# Patient Record
Sex: FEMALE | Race: WHITE | NOT HISPANIC OR LATINO | Employment: FULL TIME | URBAN - METROPOLITAN AREA
[De-identification: names, ages, dates, MRNs, and addresses within clinical notes are randomized per-mention and may not be internally consistent; named-entity substitution may affect disease eponyms.]

---

## 2021-07-22 ENCOUNTER — TELEPHONE (OUTPATIENT)
Dept: OBGYN CLINIC | Facility: HOSPITAL | Age: 56
End: 2021-07-22

## 2021-07-22 NOTE — TELEPHONE ENCOUNTER
Called patient to reschedule Dr Joceline Clayton appointment for 7/28, patient stated that she will call back to reschedule

## 2021-08-31 ENCOUNTER — APPOINTMENT (EMERGENCY)
Dept: RADIOLOGY | Facility: HOSPITAL | Age: 56
End: 2021-08-31
Payer: COMMERCIAL

## 2021-08-31 ENCOUNTER — HOSPITAL ENCOUNTER (EMERGENCY)
Facility: HOSPITAL | Age: 56
Discharge: HOME | End: 2021-08-31
Attending: EMERGENCY MEDICINE
Payer: COMMERCIAL

## 2021-08-31 VITALS
RESPIRATION RATE: 18 BRPM | SYSTOLIC BLOOD PRESSURE: 209 MMHG | HEIGHT: 64 IN | HEART RATE: 65 BPM | TEMPERATURE: 98.3 F | WEIGHT: 173.5 LBS | BODY MASS INDEX: 29.62 KG/M2 | OXYGEN SATURATION: 98 % | DIASTOLIC BLOOD PRESSURE: 96 MMHG

## 2021-08-31 DIAGNOSIS — I16.0 HYPERTENSIVE URGENCY: Primary | ICD-10-CM

## 2021-08-31 LAB
ALBUMIN SERPL BCP-MCNC: 4.1 G/DL (ref 3.5–5)
ALP SERPL-CCNC: 113 U/L (ref 46–116)
ALT SERPL W P-5'-P-CCNC: 21 U/L (ref 12–78)
ANION GAP SERPL CALCULATED.3IONS-SCNC: 7 MMOL/L (ref 4–13)
AST SERPL W P-5'-P-CCNC: 20 U/L (ref 5–45)
ATRIAL RATE: 67 BPM
BASOPHILS # BLD AUTO: 0.05 THOUSANDS/ΜL (ref 0–0.1)
BASOPHILS NFR BLD AUTO: 1 % (ref 0–1)
BILIRUB SERPL-MCNC: 0.56 MG/DL (ref 0.2–1)
BUN SERPL-MCNC: 14 MG/DL (ref 5–25)
CALCIUM SERPL-MCNC: 9.5 MG/DL (ref 8.3–10.1)
CHLORIDE SERPL-SCNC: 102 MMOL/L (ref 100–108)
CO2 SERPL-SCNC: 29 MMOL/L (ref 21–32)
CREAT SERPL-MCNC: 0.74 MG/DL (ref 0.6–1.3)
EOSINOPHIL # BLD AUTO: 0.12 THOUSAND/ΜL (ref 0–0.61)
EOSINOPHIL NFR BLD AUTO: 2 % (ref 0–6)
ERYTHROCYTE [DISTWIDTH] IN BLOOD BY AUTOMATED COUNT: 12.5 % (ref 11.6–15.1)
GFR SERPL CREATININE-BSD FRML MDRD: 91 ML/MIN/1.73SQ M
GLUCOSE SERPL-MCNC: 86 MG/DL (ref 65–140)
HCT VFR BLD AUTO: 40.7 % (ref 34.8–46.1)
HGB BLD-MCNC: 13.4 G/DL (ref 11.5–15.4)
IMM GRANULOCYTES # BLD AUTO: 0.03 THOUSAND/UL (ref 0–0.2)
IMM GRANULOCYTES NFR BLD AUTO: 1 % (ref 0–2)
LYMPHOCYTES # BLD AUTO: 1.5 THOUSANDS/ΜL (ref 0.6–4.47)
LYMPHOCYTES NFR BLD AUTO: 27 % (ref 14–44)
MCH RBC QN AUTO: 31.3 PG (ref 26.8–34.3)
MCHC RBC AUTO-ENTMCNC: 32.9 G/DL (ref 31.4–37.4)
MCV RBC AUTO: 95 FL (ref 82–98)
MONOCYTES # BLD AUTO: 0.41 THOUSAND/ΜL (ref 0.17–1.22)
MONOCYTES NFR BLD AUTO: 7 % (ref 4–12)
NEUTROPHILS # BLD AUTO: 3.52 THOUSANDS/ΜL (ref 1.85–7.62)
NEUTS SEG NFR BLD AUTO: 62 % (ref 43–75)
NRBC BLD AUTO-RTO: 0 /100 WBCS
P AXIS: 79 DEGREES
PLATELET # BLD AUTO: 202 THOUSANDS/UL (ref 149–390)
PMV BLD AUTO: 9.9 FL (ref 8.9–12.7)
POTASSIUM SERPL-SCNC: 4.3 MMOL/L (ref 3.5–5.3)
PR INTERVAL: 144 MS
PROT SERPL-MCNC: 7.7 G/DL (ref 6.4–8.2)
QRS AXIS: 65 DEGREES
QRSD INTERVAL: 82 MS
QT INTERVAL: 394 MS
QTC INTERVAL: 410 MS
RBC # BLD AUTO: 4.28 MILLION/UL (ref 3.81–5.12)
SODIUM SERPL-SCNC: 138 MMOL/L (ref 136–145)
T WAVE AXIS: 56 DEGREES
TROPONIN I SERPL-MCNC: <0.02 NG/ML
VENTRICULAR RATE: 65 BPM
WBC # BLD AUTO: 5.63 THOUSAND/UL (ref 4.31–10.16)

## 2021-08-31 PROCEDURE — 36415 COLL VENOUS BLD VENIPUNCTURE: CPT

## 2021-08-31 PROCEDURE — 84484 ASSAY OF TROPONIN QUANT: CPT | Performed by: EMERGENCY MEDICINE

## 2021-08-31 PROCEDURE — 85025 COMPLETE CBC W/AUTO DIFF WBC: CPT | Performed by: EMERGENCY MEDICINE

## 2021-08-31 PROCEDURE — 99283 EMERGENCY DEPT VISIT LOW MDM: CPT

## 2021-08-31 PROCEDURE — 93010 ELECTROCARDIOGRAM REPORT: CPT | Performed by: INTERNAL MEDICINE

## 2021-08-31 PROCEDURE — 99285 EMERGENCY DEPT VISIT HI MDM: CPT | Performed by: EMERGENCY MEDICINE

## 2021-08-31 PROCEDURE — 80053 COMPREHEN METABOLIC PANEL: CPT | Performed by: EMERGENCY MEDICINE

## 2021-08-31 PROCEDURE — 93005 ELECTROCARDIOGRAM TRACING: CPT

## 2021-08-31 RX ORDER — AMLODIPINE BESYLATE 5 MG/1
5 TABLET ORAL ONCE
Status: COMPLETED | OUTPATIENT
Start: 2021-08-31 | End: 2021-08-31

## 2021-08-31 RX ORDER — AMLODIPINE BESYLATE 5 MG/1
5 TABLET ORAL DAILY
Qty: 30 TABLET | Refills: 0 | Status: SHIPPED | OUTPATIENT
Start: 2021-08-31 | End: 2021-12-15

## 2021-08-31 RX ADMIN — AMLODIPINE BESYLATE 5 MG: 5 TABLET ORAL at 14:24

## 2021-08-31 NOTE — ED PROVIDER NOTES
History  Chief Complaint   Patient presents with    Hypertension     Pt reports checking her BP earlier today, was 170s/100s with LUE tingling and fogginess  Seen at pcp yesterday and was told to keep an eye on her BP d/t 140s/90s there  Denies HA or CP        History provided by:  Patient and relative (daughter)  Hypertension  Severity:  Moderate  Onset quality:  Gradual  Duration:  2 weeks  Timing:  Constant  Notable PTA blood pressures:  220/100  Context: abnormal sodium ( eatting out a lot more over past few months) and stress ( increased stress)    Relieved by:  None tried  Worsened by:  Nothing  Ineffective treatments:  None tried  Associated symptoms: anxiety    Associated symptoms: no abdominal pain, no chest pain, no dizziness, no fever, no headaches, no nausea, no neck pain, no palpitations, no shortness of breath and not vomiting        None       Past Medical History:   Diagnosis Date    Hypertension        Past Surgical History:   Procedure Laterality Date    ABDOMINAL SURGERY      bariatric surgery        History reviewed  No pertinent family history  I have reviewed and agree with the history as documented  E-Cigarette/Vaping     E-Cigarette/Vaping Substances     Social History     Tobacco Use    Smoking status: Never Smoker    Smokeless tobacco: Never Used   Substance Use Topics    Alcohol use: Yes     Comment: 3-4x per week     Drug use: Never       Review of Systems   Constitutional: Negative for activity change, chills, diaphoresis and fever  HENT: Negative for congestion, sinus pressure and sore throat  Eyes: Negative for pain and visual disturbance  Respiratory: Negative for cough, chest tightness, shortness of breath, wheezing and stridor  Cardiovascular: Negative for chest pain and palpitations  Gastrointestinal: Negative for abdominal distention, abdominal pain, constipation, diarrhea, nausea and vomiting  Genitourinary: Negative for dysuria and frequency  Musculoskeletal: Negative for neck pain and neck stiffness  Skin: Negative for rash  Neurological: Negative for dizziness, speech difficulty, light-headedness, numbness and headaches  Psychiatric/Behavioral: The patient is nervous/anxious  Physical Exam  Physical Exam  Vitals reviewed  Constitutional:       General: She is not in acute distress  Appearance: She is well-developed  She is not diaphoretic  HENT:      Head: Normocephalic and atraumatic  Right Ear: External ear normal       Left Ear: External ear normal       Nose: Nose normal    Eyes:      General:         Right eye: No discharge  Left eye: No discharge  Pupils: Pupils are equal, round, and reactive to light  Neck:      Trachea: No tracheal deviation  Cardiovascular:      Rate and Rhythm: Normal rate and regular rhythm  Heart sounds: Normal heart sounds  No murmur heard  Pulmonary:      Effort: Pulmonary effort is normal  No respiratory distress  Breath sounds: Normal breath sounds  No stridor  Abdominal:      General: There is no distension  Palpations: Abdomen is soft  Tenderness: There is no abdominal tenderness  There is no guarding or rebound  Musculoskeletal:         General: Normal range of motion  Cervical back: Normal range of motion and neck supple  Skin:     General: Skin is warm and dry  Coloration: Skin is not pale  Findings: No erythema  Neurological:      General: No focal deficit present  Mental Status: She is alert and oriented to person, place, and time           Vital Signs  ED Triage Vitals [08/31/21 1217]   Temperature Pulse Respirations Blood Pressure SpO2   98 3 °F (36 8 °C) 65 18 (!) 222/114 98 %      Temp Source Heart Rate Source Patient Position - Orthostatic VS BP Location FiO2 (%)   Oral Monitor Sitting Left arm --      Pain Score       No Pain           Vitals:    08/31/21 1217 08/31/21 1226 08/31/21 1359   BP: (!) 222/114 (!) 197/113 (!) 209/96   Pulse: 65     Patient Position - Orthostatic VS: Sitting  Sitting         Visual Acuity      ED Medications  Medications   amLODIPine (NORVASC) tablet 5 mg (5 mg Oral Given 8/31/21 1424)       Diagnostic Studies  Results Reviewed     Procedure Component Value Units Date/Time    Troponin I [[id number]]  (Normal) Collected: 08/31/21 1223    Lab Status: Final result Specimen: Blood from Arm, Left Updated: 08/31/21 1252     Troponin I <0 02 ng/mL     Comprehensive metabolic panel [[id number]] Collected: 08/31/21 1223    Lab Status: Final result Specimen: Blood from Arm, Left Updated: 08/31/21 1249     Sodium 138 mmol/L      Potassium 4 3 mmol/L      Chloride 102 mmol/L      CO2 29 mmol/L      ANION GAP 7 mmol/L      BUN 14 mg/dL      Creatinine 0 74 mg/dL      Glucose 86 mg/dL      Calcium 9 5 mg/dL      AST 20 U/L      ALT 21 U/L      Alkaline Phosphatase 113 U/L      Total Protein 7 7 g/dL      Albumin 4 1 g/dL      Total Bilirubin 0 56 mg/dL      eGFR 91 ml/min/1 73sq m     Narrative:      [facility] guidelines for Chronic Kidney Disease (CKD):     Stage 1 with normal or high GFR (GFR > 90 mL/min/1 73 square meters)    Stage 2 Mild CKD (GFR = 60-89 mL/min/1 73 square meters)    Stage 3A Moderate CKD (GFR = 45-59 mL/min/1 73 square meters)    Stage 3B Moderate CKD (GFR = 30-44 mL/min/1 73 square meters)    Stage 4 Severe CKD (GFR = 15-29 mL/min/1 73 square meters)    Stage 5 End Stage CKD (GFR <15 mL/min/1 73 square meters)  Note: GFR calculation is accurate only with a steady state creatinine    CBC and differential [[id number]] Collected: 08/31/21 1223    Lab Status: Final result Specimen: Blood from Arm, Left Updated: 08/31/21 1230     WBC 5 63 Thousand/uL      RBC 4 28 Million/uL      Hemoglobin 13 4 g/dL      Hematocrit 40 7 %      MCV 95 fL      MCH 31 3 pg      MCHC 32 9 g/dL      RDW 12 5 %      MPV 9 9 fL      Platelets 366 Thousands/uL      nRBC 0 /100 WBCs Neutrophils Relative 62 %      Immat GRANS % 1 %      Lymphocytes Relative 27 %      Monocytes Relative 7 %      Eosinophils Relative 2 %      Basophils Relative 1 %      Neutrophils Absolute 3 52 Thousands/µL      Immature Grans Absolute 0 03 Thousand/uL      Lymphocytes Absolute 1 50 Thousands/µL      Monocytes Absolute 0 41 Thousand/µL      Eosinophils Absolute 0 12 Thousand/µL      Basophils Absolute 0 05 Thousands/µL                  No orders to display              Procedures  ECG 12 Lead Documentation Only    Date/Time: 8/31/2021 2:44 PM  Performed by: Gt Mehta DO  Authorized by: Gt Mehta DO     ECG reviewed by me, the ED Provider: yes    Patient location:  ED  Interpretation:     Interpretation: non-specific    Rate:     ECG rate:  65    ECG rate assessment: normal    Rhythm:     Rhythm: sinus rhythm    Ectopy:     Ectopy: PVCs      PVCs:  Frequent  QRS:     QRS axis:  Normal    QRS intervals:  Normal  Conduction:     Conduction: normal    ST segments:     ST segments:  Non-specific  T waves:     T waves: non-specific               ED Course                                           MDM  Number of Diagnoses or Management Options  Hypertensive urgency: new and requires workup  Diagnosis management comments: Patient otherwise asymptomatic blood work unremarkable, will add amlodipine  , patient agrees to discharge plan      Disposition  Final diagnoses:   Hypertensive urgency     Time reflects when diagnosis was documented in both MDM as applicable and the Disposition within this note     Time User Action Codes Description Comment    8/31/2021  2:07 PM Flip Mackay Add [I16 0] Hypertensive urgency       ED Disposition     ED Disposition Condition Date/Time Comment    Discharge Stable Tue Aug 31, 2021  2:07 PM Liana Holley discharge to home/self care              Follow-up Information     Follow up With Specialties Details Why Contact Bruno Noriega  Go in 1 week For repeat evaluation and  to ensure resolution Snehaverm 13            Discharge Medication List as of 8/31/2021  2:10 PM      START taking these medications    Details   amLODIPine (NORVASC) 5 mg tablet Take 1 tablet (5 mg total) by mouth daily, Starting Tue 8/31/2021, Normal           No discharge procedures on file      PDMP Review     None          ED Provider  Electronically Signed by           Annita Brooks DO  08/31/21 8142

## 2021-11-04 ENCOUNTER — OFFICE VISIT (OUTPATIENT)
Dept: OBGYN CLINIC | Facility: CLINIC | Age: 56
End: 2021-11-04
Payer: COMMERCIAL

## 2021-11-04 VITALS
WEIGHT: 167 LBS | SYSTOLIC BLOOD PRESSURE: 116 MMHG | BODY MASS INDEX: 27.82 KG/M2 | RESPIRATION RATE: 17 BRPM | DIASTOLIC BLOOD PRESSURE: 78 MMHG | HEIGHT: 65 IN | HEART RATE: 69 BPM

## 2021-11-04 DIAGNOSIS — M72.0 DUPUYTREN'S CONTRACTURE OF LEFT HAND: Primary | ICD-10-CM

## 2021-11-04 PROCEDURE — 99203 OFFICE O/P NEW LOW 30 MIN: CPT | Performed by: SURGERY

## 2021-11-04 RX ORDER — AMLODIPINE BESYLATE 10 MG/1
10 TABLET ORAL DAILY
COMMUNITY
Start: 2021-10-07 | End: 2022-10-07

## 2021-11-04 RX ORDER — LISINOPRIL 20 MG/1
1 TABLET ORAL DAILY
COMMUNITY
Start: 2021-10-02 | End: 2021-12-15 | Stop reason: SDUPTHER

## 2021-11-04 RX ORDER — TRAZODONE HYDROCHLORIDE 100 MG/1
1 TABLET ORAL DAILY
COMMUNITY
Start: 2021-10-02

## 2021-11-04 RX ORDER — METOPROLOL SUCCINATE 50 MG/1
50 TABLET, EXTENDED RELEASE ORAL DAILY
COMMUNITY
Start: 2021-10-07 | End: 2022-10-07

## 2021-11-09 ENCOUNTER — OFFICE VISIT (OUTPATIENT)
Dept: OBGYN CLINIC | Facility: CLINIC | Age: 56
End: 2021-11-09
Payer: COMMERCIAL

## 2021-11-09 ENCOUNTER — APPOINTMENT (OUTPATIENT)
Dept: RADIOLOGY | Facility: AMBULARY SURGERY CENTER | Age: 56
End: 2021-11-09
Payer: COMMERCIAL

## 2021-11-09 VITALS
DIASTOLIC BLOOD PRESSURE: 76 MMHG | BODY MASS INDEX: 27.49 KG/M2 | SYSTOLIC BLOOD PRESSURE: 108 MMHG | WEIGHT: 165 LBS | HEIGHT: 65 IN | HEART RATE: 63 BPM

## 2021-11-09 DIAGNOSIS — M54.2 NECK PAIN: Primary | ICD-10-CM

## 2021-11-09 DIAGNOSIS — M25.511 CHRONIC RIGHT SHOULDER PAIN: ICD-10-CM

## 2021-11-09 DIAGNOSIS — G89.29 CHRONIC RIGHT SHOULDER PAIN: ICD-10-CM

## 2021-11-09 PROCEDURE — 99214 OFFICE O/P EST MOD 30 MIN: CPT | Performed by: PHYSICAL MEDICINE & REHABILITATION

## 2021-11-09 PROCEDURE — 72040 X-RAY EXAM NECK SPINE 2-3 VW: CPT

## 2021-11-09 RX ORDER — GABAPENTIN 300 MG/1
300 CAPSULE ORAL
Qty: 90 CAPSULE | Refills: 0 | Status: SHIPPED | OUTPATIENT
Start: 2021-11-09 | End: 2022-07-27 | Stop reason: ALTCHOICE

## 2021-11-09 RX ORDER — METHYLPREDNISOLONE 4 MG/1
TABLET ORAL
Qty: 1 EACH | Refills: 0 | Status: SHIPPED | OUTPATIENT
Start: 2021-11-09

## 2021-11-10 ENCOUNTER — EVALUATION (OUTPATIENT)
Dept: PHYSICAL THERAPY | Facility: MEDICAL CENTER | Age: 56
End: 2021-11-10
Payer: COMMERCIAL

## 2021-11-10 DIAGNOSIS — M54.2 NECK PAIN: ICD-10-CM

## 2021-11-10 DIAGNOSIS — G89.29 CHRONIC RIGHT SHOULDER PAIN: ICD-10-CM

## 2021-11-10 DIAGNOSIS — M25.511 CHRONIC RIGHT SHOULDER PAIN: ICD-10-CM

## 2021-11-10 PROCEDURE — 97161 PT EVAL LOW COMPLEX 20 MIN: CPT | Performed by: PHYSICAL THERAPIST

## 2021-11-10 PROCEDURE — 97110 THERAPEUTIC EXERCISES: CPT | Performed by: PHYSICAL THERAPIST

## 2021-11-12 ENCOUNTER — OFFICE VISIT (OUTPATIENT)
Dept: PHYSICAL THERAPY | Facility: MEDICAL CENTER | Age: 56
End: 2021-11-12
Payer: COMMERCIAL

## 2021-11-12 DIAGNOSIS — G89.29 CHRONIC RIGHT SHOULDER PAIN: Primary | ICD-10-CM

## 2021-11-12 DIAGNOSIS — M54.2 NECK PAIN: ICD-10-CM

## 2021-11-12 DIAGNOSIS — M25.511 CHRONIC RIGHT SHOULDER PAIN: Primary | ICD-10-CM

## 2021-11-12 PROCEDURE — 97140 MANUAL THERAPY 1/> REGIONS: CPT | Performed by: PHYSICAL THERAPIST

## 2021-11-19 ENCOUNTER — APPOINTMENT (OUTPATIENT)
Dept: PHYSICAL THERAPY | Facility: MEDICAL CENTER | Age: 56
End: 2021-11-19
Payer: COMMERCIAL

## 2021-12-15 ENCOUNTER — OFFICE VISIT (OUTPATIENT)
Dept: CARDIOLOGY CLINIC | Facility: MEDICAL CENTER | Age: 56
End: 2021-12-15
Payer: COMMERCIAL

## 2021-12-15 VITALS
WEIGHT: 164 LBS | HEART RATE: 70 BPM | SYSTOLIC BLOOD PRESSURE: 108 MMHG | DIASTOLIC BLOOD PRESSURE: 70 MMHG | HEIGHT: 65 IN | BODY MASS INDEX: 27.32 KG/M2 | OXYGEN SATURATION: 99 %

## 2021-12-15 DIAGNOSIS — I10 PRIMARY HYPERTENSION: Primary | ICD-10-CM

## 2021-12-15 DIAGNOSIS — R00.2 PALPITATIONS: ICD-10-CM

## 2021-12-15 DIAGNOSIS — G47.09 OTHER INSOMNIA: ICD-10-CM

## 2021-12-15 PROCEDURE — 99204 OFFICE O/P NEW MOD 45 MIN: CPT | Performed by: INTERNAL MEDICINE

## 2021-12-15 RX ORDER — LISINOPRIL 20 MG/1
20 TABLET ORAL DAILY
Qty: 90 TABLET | Refills: 3 | Status: SHIPPED | OUTPATIENT
Start: 2021-12-15

## 2021-12-16 ENCOUNTER — TELEPHONE (OUTPATIENT)
Dept: CARDIOLOGY CLINIC | Facility: CLINIC | Age: 56
End: 2021-12-16

## 2021-12-17 ENCOUNTER — OFFICE VISIT (OUTPATIENT)
Dept: GASTROENTEROLOGY | Facility: AMBULARY SURGERY CENTER | Age: 56
End: 2021-12-17
Payer: COMMERCIAL

## 2021-12-17 VITALS
DIASTOLIC BLOOD PRESSURE: 75 MMHG | WEIGHT: 166.2 LBS | BODY MASS INDEX: 27.69 KG/M2 | HEIGHT: 65 IN | SYSTOLIC BLOOD PRESSURE: 119 MMHG

## 2021-12-17 DIAGNOSIS — Z12.11 COLON CANCER SCREENING: Primary | ICD-10-CM

## 2021-12-17 DIAGNOSIS — R10.33 PERIUMBILICAL ABDOMINAL PAIN: ICD-10-CM

## 2021-12-17 DIAGNOSIS — R19.7 DIARRHEA, UNSPECIFIED TYPE: ICD-10-CM

## 2021-12-17 PROCEDURE — 99244 OFF/OP CNSLTJ NEW/EST MOD 40: CPT | Performed by: INTERNAL MEDICINE

## 2021-12-17 RX ORDER — SODIUM PICOSULFATE, MAGNESIUM OXIDE, AND ANHYDROUS CITRIC ACID 10; 3.5; 12 MG/160ML; G/160ML; G/160ML
LIQUID ORAL
Qty: 320 ML | Refills: 0 | Status: SHIPPED | OUTPATIENT
Start: 2021-12-17 | End: 2022-04-04 | Stop reason: SDUPTHER

## 2021-12-29 ENCOUNTER — TELEPHONE (OUTPATIENT)
Dept: SLEEP CENTER | Facility: CLINIC | Age: 56
End: 2021-12-29

## 2022-01-24 ENCOUNTER — EVENT RECORDER/EXTENDED HOLTER (OUTPATIENT)
Dept: CARDIOLOGY CLINIC | Facility: MEDICAL CENTER | Age: 57
End: 2022-01-24
Payer: COMMERCIAL

## 2022-01-24 DIAGNOSIS — R00.2 PALPITATIONS: ICD-10-CM

## 2022-01-24 PROCEDURE — 93244 EXT ECG>48HR<7D REV&INTERPJ: CPT | Performed by: INTERNAL MEDICINE

## 2022-01-28 ENCOUNTER — DOCUMENTATION (OUTPATIENT)
Dept: CARDIOLOGY CLINIC | Facility: CLINIC | Age: 57
End: 2022-01-28

## 2022-01-28 ENCOUNTER — TELEPHONE (OUTPATIENT)
Dept: CARDIOLOGY CLINIC | Facility: CLINIC | Age: 57
End: 2022-01-28

## 2022-01-28 DIAGNOSIS — I47.1 SUPRAVENTRICULAR TACHYCARDIA (HCC): Primary | ICD-10-CM

## 2022-01-28 NOTE — TELEPHONE ENCOUNTER
Called patient and gave results  ----- Message from Corrina Saldivar MD sent at 1/25/2022  2:40 PM EST -----  Patient had a min HR of 55 bpm, max HR of 146 bpm, and avg HR of 75  bpm  Predominant underlying rhythm was Sinus Rhythm  3  Supraventricular Tachycardia runs occurred, the run with the fastest  interval lasting 7 beats with a max rate of 138 bpm, the longest lasting 20  beats with an avg rate of 123 bpm  Isolated SVEs were rare (<1 0%), SVE  Couplets were rare (<1 0%), and SVE Triplets were rare (<1 0%)  Isolated  VEs were occasional (2 4%, 48906), VE Couplets were rare (<1 0%, 92),  and no VE Triplets were present  Ventricular Bigeminy and Trigeminy were  Present  Impression:  1  Abnormal Zio monitor  2  Patient with multiple short runs of SVT, possible atrial tachycardia  Please call patient and let her know that she has short episodes of a supraventricular tachycardia  Appears benign, but likely contributing to symptoms  Have her discontinue amlodipine and start Diltiazem CV 180mg daily  Thanks

## 2022-02-02 LAB
ALBUMIN SERPL-MCNC: 4.4 G/DL (ref 3.8–4.9)
ALBUMIN/GLOB SERPL: 1.8 {RATIO} (ref 1.2–2.2)
ALP SERPL-CCNC: 93 IU/L (ref 44–121)
ALT SERPL-CCNC: 14 IU/L (ref 0–32)
AST SERPL-CCNC: 21 IU/L (ref 0–40)
BILIRUB SERPL-MCNC: 0.2 MG/DL (ref 0–1.2)
BUN SERPL-MCNC: 14 MG/DL (ref 6–24)
BUN/CREAT SERPL: 20 (ref 9–23)
CALCIUM SERPL-MCNC: 9 MG/DL (ref 8.7–10.2)
CHLORIDE SERPL-SCNC: 103 MMOL/L (ref 96–106)
CO2 SERPL-SCNC: 25 MMOL/L (ref 20–29)
CREAT SERPL-MCNC: 0.71 MG/DL (ref 0.57–1)
GLOBULIN SER-MCNC: 2.5 G/DL (ref 1.5–4.5)
GLUCOSE SERPL-MCNC: 82 MG/DL (ref 65–99)
POTASSIUM SERPL-SCNC: 4.7 MMOL/L (ref 3.5–5.2)
PROT SERPL-MCNC: 6.9 G/DL (ref 6–8.5)
SL AMB EGFR AFRICAN AMERICAN: 110 ML/MIN/1.73
SL AMB EGFR NON AFRICAN AMERICAN: 96 ML/MIN/1.73
SODIUM SERPL-SCNC: 142 MMOL/L (ref 134–144)

## 2022-02-03 ENCOUNTER — HOSPITAL ENCOUNTER (OUTPATIENT)
Dept: CT IMAGING | Facility: HOSPITAL | Age: 57
Discharge: HOME | End: 2022-02-03
Attending: INTERNAL MEDICINE
Payer: COMMERCIAL

## 2022-02-03 DIAGNOSIS — R19.7 DIARRHEA, UNSPECIFIED TYPE: ICD-10-CM

## 2022-02-03 PROCEDURE — 74177 CT ABD & PELVIS W/CONTRAST: CPT

## 2022-02-03 PROCEDURE — G1004 CDSM NDSC: HCPCS

## 2022-02-03 RX ADMIN — IOHEXOL 100 ML: 350 INJECTION, SOLUTION INTRAVENOUS at 17:56

## 2022-02-07 DIAGNOSIS — I10 PRIMARY HYPERTENSION: Primary | ICD-10-CM

## 2022-02-09 RX ORDER — DILTIAZEM HYDROCHLORIDE 180 MG/1
180 CAPSULE, COATED, EXTENDED RELEASE ORAL DAILY
Qty: 30 CAPSULE | Refills: 3 | Status: SHIPPED | OUTPATIENT
Start: 2022-02-09 | End: 2022-07-27 | Stop reason: ALTCHOICE

## 2022-02-11 ENCOUNTER — TELEPHONE (OUTPATIENT)
Dept: GASTROENTEROLOGY | Facility: CLINIC | Age: 57
End: 2022-02-11

## 2022-02-11 NOTE — TELEPHONE ENCOUNTER
----- Message from Ortega Guevara MD sent at 2/10/2022  5:10 PM EST -----  I called the patient to discuss the results of the CT scan however there was no answer, I did leave a message  No evidence of diverticulitis but there is diverticulosis  Interestingly she had a focus of air within her urinary bladder, unclear etiology  Does not look like anything concerning however I wanted to make sure the patient followed up with her PCP in regards to this  Please call the patient tomorrow with the results    Thank you

## 2022-02-13 RX ORDER — DILTIAZEM HYDROCHLORIDE 180 MG/1
180 CAPSULE, COATED, EXTENDED RELEASE ORAL DAILY
Qty: 90 CAPSULE | Refills: 3 | Status: SHIPPED | OUTPATIENT
Start: 2022-02-13 | End: 2022-07-27 | Stop reason: ALTCHOICE

## 2022-02-14 ENCOUNTER — TELEPHONE (OUTPATIENT)
Dept: GASTROENTEROLOGY | Facility: CLINIC | Age: 57
End: 2022-02-14

## 2022-02-14 NOTE — TELEPHONE ENCOUNTER
Spoke with pt and relayed results  Pt mentioned that of recently she has noticed her acid reflux has gotten worse  She expressed taking a significant amount of tums multiple times a week  She has a colonoscopy scheduled for 4/6/2022 and wants to add on EGD to evaluate her GERD

## 2022-02-14 NOTE — TELEPHONE ENCOUNTER
----- Message from Veronika Boykin MD sent at 2/10/2022  5:10 PM EST -----  I called the patient to discuss the results of the CT scan however there was no answer, I did leave a message  No evidence of diverticulitis but there is diverticulosis  Interestingly she had a focus of air within her urinary bladder, unclear etiology  Does not look like anything concerning however I wanted to make sure the patient followed up with her PCP in regards to this  Please call the patient tomorrow with the results    Thank you

## 2022-03-02 ENCOUNTER — TELEPHONE (OUTPATIENT)
Dept: CARDIOLOGY CLINIC | Facility: MEDICAL CENTER | Age: 57
End: 2022-03-02

## 2022-03-25 ENCOUNTER — TELEPHONE (OUTPATIENT)
Dept: PULMONOLOGY | Facility: CLINIC | Age: 57
End: 2022-03-25

## 2022-03-25 NOTE — TELEPHONE ENCOUNTER
LM TO PT:  To  arrive @ 3pm on 3/29 and drop off device the next day no later than 10am   Call back w/ dedtime to set up device

## 2022-03-29 ENCOUNTER — TELEPHONE (OUTPATIENT)
Dept: PULMONOLOGY | Facility: CLINIC | Age: 57
End: 2022-03-29

## 2022-03-31 ENCOUNTER — ANESTHESIA EVENT (OUTPATIENT)
Dept: ANESTHESIOLOGY | Facility: HOSPITAL | Age: 57
End: 2022-03-31

## 2022-03-31 ENCOUNTER — ANESTHESIA (OUTPATIENT)
Dept: ANESTHESIOLOGY | Facility: HOSPITAL | Age: 57
End: 2022-03-31

## 2022-04-04 ENCOUNTER — TELEPHONE (OUTPATIENT)
Dept: GASTROENTEROLOGY | Facility: CLINIC | Age: 57
End: 2022-04-04

## 2022-04-04 ENCOUNTER — NURSE TRIAGE (OUTPATIENT)
Dept: OTHER | Facility: OTHER | Age: 57
End: 2022-04-04

## 2022-04-04 DIAGNOSIS — Z12.11 COLON CANCER SCREENING: ICD-10-CM

## 2022-04-04 RX ORDER — SODIUM PICOSULFATE, MAGNESIUM OXIDE, AND ANHYDROUS CITRIC ACID 10; 3.5; 12 MG/160ML; G/160ML; G/160ML
LIQUID ORAL
Qty: 320 ML | Refills: 0 | Status: SHIPPED | OUTPATIENT
Start: 2022-04-04 | End: 2022-07-27 | Stop reason: ALTCHOICE

## 2022-04-04 RX ORDER — SODIUM PICOSULFATE, MAGNESIUM OXIDE, AND ANHYDROUS CITRIC ACID 10; 3.5; 12 MG/160ML; G/160ML; G/160ML
LIQUID ORAL
Qty: 320 ML | Refills: 0 | Status: SHIPPED | OUTPATIENT
Start: 2022-04-04 | End: 2022-04-04 | Stop reason: SDUPTHER

## 2022-04-04 NOTE — TELEPHONE ENCOUNTER
Patients GI provider:  Dr Micah Heart    Number to return call: [phone]    Reason for call: Pt calling for an alternative prep as her insurance will not cover her clenpiq until this Thursday and her procedure is scheduled for Wednesday  Pt is asking for an alternative prep      Scheduled procedure/appointment date if applicable: Procedure: 4/02/3120

## 2022-04-04 NOTE — TELEPHONE ENCOUNTER
Reason for Disposition   [1] Caller has NON-URGENT question AND [2] triager unable to answer question    Answer Assessment - Initial Assessment Questions  1  DATE/TIME: "When did you have your colonoscopy?"       4/6 12pm  2   MAIN CONCERN: "What is your main concern right now?" "What questions do you have?"      Requesting clarification of prep, and to have the clenpiq sent to [facility] in [facility]    Protocols used: COLONOSCOPY SYMPTOMS AND QUESTIONS-ADULT-

## 2022-04-04 NOTE — TELEPHONE ENCOUNTER
Patient went to  Clenpiq that was sent back in Dec but pharmacy no longer has prescription  Requested Clenpiq be re-sent to [facility] in [place]

## 2022-04-04 NOTE — TELEPHONE ENCOUNTER
Regarding: Colonoscopy prep  ----- Message from Mary Ramires sent at 4/4/2022  2:07 PM EDT -----  "I have a colonoscopy scheduled for Wednesday   Do I have to do a stool softener prior?"

## 2022-04-04 NOTE — TELEPHONE ENCOUNTER
Patient questioning if she can take her CBD gummies around midnight the night before her colonoscopy  Please advise

## 2022-04-05 ENCOUNTER — TELEPHONE (OUTPATIENT)
Dept: OTHER | Facility: OTHER | Age: 57
End: 2022-04-05

## 2022-04-05 NOTE — TELEPHONE ENCOUNTER
Patient woke up sick this morning and has since tested positive  Colonoscopy rescheduled to 07/27/22 at [facility]

## 2022-05-24 ENCOUNTER — NURSE TRIAGE (OUTPATIENT)
Dept: OTHER | Facility: OTHER | Age: 57
End: 2022-05-24

## 2022-05-24 ENCOUNTER — OFFICE VISIT (OUTPATIENT)
Dept: GASTROENTEROLOGY | Facility: CLINIC | Age: 57
End: 2022-05-24
Payer: COMMERCIAL

## 2022-05-24 VITALS
TEMPERATURE: 97.7 F | OXYGEN SATURATION: 97 % | SYSTOLIC BLOOD PRESSURE: 111 MMHG | HEART RATE: 73 BPM | BODY MASS INDEX: 28.32 KG/M2 | WEIGHT: 170 LBS | HEIGHT: 65 IN | DIASTOLIC BLOOD PRESSURE: 76 MMHG

## 2022-05-24 DIAGNOSIS — K62.9 PERIANAL LESION: ICD-10-CM

## 2022-05-24 DIAGNOSIS — Z86.010 HISTORY OF COLON POLYPS: ICD-10-CM

## 2022-05-24 DIAGNOSIS — K21.9 GASTROESOPHAGEAL REFLUX DISEASE WITHOUT ESOPHAGITIS: Primary | ICD-10-CM

## 2022-05-24 DIAGNOSIS — K62.9 RECTAL LESION: ICD-10-CM

## 2022-05-24 PROCEDURE — 99214 OFFICE O/P EST MOD 30 MIN: CPT | Performed by: PHYSICIAN ASSISTANT

## 2022-05-24 RX ORDER — PANTOPRAZOLE SODIUM 40 MG/1
40 TABLET, DELAYED RELEASE ORAL
Qty: 90 TABLET | Refills: 0 | Status: SHIPPED | OUTPATIENT
Start: 2022-05-24

## 2022-05-24 RX ORDER — ALPRAZOLAM 0.25 MG/1
TABLET ORAL
COMMUNITY

## 2022-05-24 NOTE — PROGRESS NOTES
[facility] - Outpatient Follow-up Note  Chapis Nelson 65 y o  female MRN: [id number]  Encounter: [id number]          ASSESSMENT AND PLAN:      1  Perianal lesion  Reports symptoms for a few years however has increased in number and size recently  History of HPV diagnosed cervically  On exam there is a small raised, symmetrical, non discolored area around 11 oclock measuring around 3 mm  There is a central linear tear with minimal erythema  Denies tenderness to palpation  No fluctuance  No family history of colorectal cancer  Appetite and weight are normal   No blood in stool  Unclear etiology, does not seem compatible with anal warts, abscess, fissure or hemorrhoids  -patient is schedule for colonoscopy in 2 months  We can reassess at that time to see if area appears changed  Patient also reports feeling several areas however I only note 1 on exam today   -due to history of HPV and unclear etiology on rectal exam, will place colorectal referral   -bowel movements have been regular     -recommend avoiding applying anything externally at this time  2  History of colon polyps  Last colonoscopy 6 years ago with repeat recommended in 5  No known family history of colon cancer  CBC last year was normal   Bowel movements regular, no blood in the stool  No blood thinners     -colonoscopy scheduled for 07/27/2022    3  GERD  Reports symptoms for over 5 years which were controlled with Tums however frequency has increased recently  Taking Tums 3 to 5 times a week  Recent NSAID use for toe fracture  No nausea, vomiting, dysphagia or decreased appetite or unintentional weight loss  -EGD scheduled for 07/27/2022    -start Protonix 40 mg daily  -further recommendations after endoscopy  If EGD is unremarkable, could consider tapering off at that time and using Pepcid instead    If any esophagitis or signs of Barretts, discussed with patient that we would likely continue PPI at that time   -gave handout on GERD  -recommend avoiding spicy food, fatty food, tomato based products, citrus, carbonated beverage  Patient drinks caffeinated ice tea in the morning around 16-20 oz  Discussed if no improvement with medication may need to try to decrease amount or try decaffeinated  Follow up after procedures  ______________________________________________________________________    SUBJECTIVE:      Layne Jesus is a 52-year-old female with past medical history of hypertension who presents to the office for same-day appointment for concern for anal lesion  Patient was last in the office 12/2021 for colon cancer screening and abdominal pain  EGD and colonoscopy were set up which have not been performed yet  She did have CT scan at that time which showed diverticulosis however no diverticulitis  There is also a small focus of gas in the bladder and patient was recommended to follow-up with her PCP  Patient reports noticing rectal lesion a few years ago around diagnosis of HPV  She reports she occasionally would feel of bump around the rectum which she described as a pimple  This would come and go however over the past week she has noticed increase in frequency as well as size  She denies any associated blood, pruritus, pain  Bowel movements have been normal   No unintentional weight loss or decreased appetite  Patient also reports symptoms of reflux for the past several years which were occasional however recently have been increased  Taking Tums at least 3 to 5 times a week  Denies smoking or alcohol use  Has been taking some ibuprofen recently due to recent toe fracture  Family history of pancreatic cancer in her mother diagnosed at 70years old  Denies any history of colorectal cancers  Last colonoscopy 6 years ago with repeat recommended in 5  She is scheduled for colonoscopy with a 07/27/2022        REVIEW OF SYSTEMS IS OTHERWISE NEGATIVE  Historical Information   Past Medical History:   Diagnosis Date    High cholesterol     Hypertension      Past Surgical History:   Procedure Laterality Date    ABDOMINAL SURGERY      bariatric surgery     CHOLECYSTECTOMY      COLONOSCOPY      OVARY SURGERY Right      Social History   Social History     Substance and Sexual Activity   Alcohol Use Yes    Comment: Social - few times a week     Social History     Substance and Sexual Activity   Drug Use Never     Social History     Tobacco Use   Smoking Status Never Smoker   Smokeless Tobacco Never Used     Family History   Problem Relation Age of Onset    Diabetes Mother     Pancreatic cancer Mother     Prostate cancer Father        Meds/Allergies       Current Outpatient Medications:     amLODIPine (NORVASC) 10 mg tablet    diltiazem (CARDIZEM CD) 180 mg 24 hr capsule    diltiazem (CARDIZEM CD) 180 mg 24 hr capsule    gabapentin (NEURONTIN) 300 mg capsule    lisinopril (ZESTRIL) 20 mg tablet    methylPREDNISolone 4 MG tablet therapy pack    metoprolol succinate (TOPROL-XL) 50 mg 24 hr tablet    NON FORMULARY    Sod Picosulfate-Mag Ox-Cit Acd (Clenpiq) 10-3 5-12 MG-GM -GM/160ML SOLN    traZODone (DESYREL) 100 mg tablet    No Known Allergies        Objective     Weight 170 lb, height 5 ft 5 in, temperature 97 7°, blood pressure 111/76, pulse 73, O2 97%    PHYSICAL EXAM:      General Appearance:   Alert, cooperative, no distress   HEENT:   Normocephalic, atraumatic, anicteric      Neck:  Supple, symmetrical, trachea midline   Lungs:   Clear to auscultation bilaterally; no rales, rhonchi or wheezing; respirations unlabored    Heart[de-identified]   Regular rate and rhythm; no murmur, rub, or gallop  Abdomen:   Soft, non-tender, non-distended; normal bowel sounds; no masses, no organomegaly    Genitalia:   Deferred    Rectal:   + small, 2-3 mm raised area around 11 oclock  Central linear tear/cut noted within area  No tenderness to palpation    No fluctuance  Does not appear compatible with abscess, hemorrhoids or anal warts  Extremities:  No cyanosis, clubbing or edema    Pulses:  2+ and symmetric    Skin:  No jaundice, rashes, or lesions    Lymph nodes:  No palpable cervical lymphadenopathy        Lab Results:   No visits with results within 1 Day(s) from this visit  Latest known visit with results is:   Orders Only on 02/01/2022   Component Date Value    Glucose, Random 02/01/2022 82     BUN 02/01/2022 14     Creatinine 02/01/2022 0 71     eGFR Non  02/01/2022 96     eGFR  02/01/2022 110     SL AMB BUN/CREATININE RA* 02/01/2022 20     Sodium 02/01/2022 142     Potassium 02/01/2022 4 7     Chloride 02/01/2022 103     CO2 02/01/2022 25     CALCIUM 02/01/2022 9 0     Protein, Total 02/01/2022 6 9     Albumin 02/01/2022 4 4     Globulin, Total 02/01/2022 2 5     Albumin/Globulin Ratio 02/01/2022 1 8     TOTAL BILIRUBIN 02/01/2022 0 2     Alk Phos Isoenzymes 02/01/2022 93     AST 02/01/2022 21     ALT 02/01/2022 14          Radiology Results:   No results found

## 2022-05-24 NOTE — TELEPHONE ENCOUNTER
patient calling in stating she has a pea-sized lesion on her anal area and she would like to be seen today because she feels like it is larger and more raised  She states that it is not open and not painful  She is concerned that if it waits then it will get worse  While looking for appointments informed patient that the earliest available would be Friday  She would like to know if she could get an appointment today even if it is not at the [place] office  Please follow up with patient for scheduling

## 2022-05-24 NOTE — TELEPHONE ENCOUNTER
Reason for Disposition   Patient wants to be seen    Answer Assessment - Initial Assessment Questions  1  MECHANISM: "How did the injury happen?" (Always consider the possibility of sexual assault)       Unsure     2  ONSET: "When did the injury happen?" (Minutes or hours ago)      3-4 days ago     3  LOCATION: "What part of the genitals is injured?"      Anus     4  APPEARANCE: "What does the injury look like?"       About a pea sized     5  BLEEDING: "Is the injury still bleeding?" If Yes, ask: "Is it difficult to stop?"       No bleeding     6  SIZE: For cuts, bruises, or swelling, ask: "How large is it?" (e g , inches or centimeters)       Not open     7  PAIN: "Is it painful?" If Yes, ask: "How bad is the pain?"   (e g , Scale 1-10; or mild, moderate, severe)      Not painful     8  TETANUS: For any breaks in the skin, ask: "When was the last tetanus booster?"       Up to date     9    OTHER SYMPTOMS: "Do you have any other symptoms? (e g , abdominal pain, shoulder pain, lightheadedness)     No other symptoms    Protocols used: SKIN INJURY-ADULT-[place], GENITAL INJURY - [facility] - ALEX GALVEZ

## 2022-07-26 ENCOUNTER — TELEPHONE (OUTPATIENT)
Dept: GASTROENTEROLOGY | Facility: AMBULARY SURGERY CENTER | Age: 57
End: 2022-07-26

## 2022-07-26 RX ORDER — SODIUM CHLORIDE, SODIUM LACTATE, POTASSIUM CHLORIDE, CALCIUM CHLORIDE 600; 310; 30; 20 MG/100ML; MG/100ML; MG/100ML; MG/100ML
100 INJECTION, SOLUTION INTRAVENOUS CONTINUOUS
Status: CANCELLED | OUTPATIENT
Start: 2022-07-26

## 2022-07-27 ENCOUNTER — ANESTHESIA (OUTPATIENT)
Dept: GASTROENTEROLOGY | Facility: AMBULARY SURGERY CENTER | Age: 57
End: 2022-07-27

## 2022-07-27 ENCOUNTER — HOSPITAL ENCOUNTER (OUTPATIENT)
Dept: GASTROENTEROLOGY | Facility: AMBULARY SURGERY CENTER | Age: 57
Setting detail: OUTPATIENT SURGERY
Discharge: HOME | End: 2022-07-27
Attending: INTERNAL MEDICINE | Admitting: INTERNAL MEDICINE
Payer: COMMERCIAL

## 2022-07-27 ENCOUNTER — ANESTHESIA EVENT (OUTPATIENT)
Dept: GASTROENTEROLOGY | Facility: AMBULARY SURGERY CENTER | Age: 57
End: 2022-07-27

## 2022-07-27 VITALS
HEIGHT: 64 IN | SYSTOLIC BLOOD PRESSURE: 129 MMHG | RESPIRATION RATE: 22 BRPM | OXYGEN SATURATION: 98 % | HEART RATE: 65 BPM | TEMPERATURE: 97.3 F | DIASTOLIC BLOOD PRESSURE: 66 MMHG | WEIGHT: 162 LBS | BODY MASS INDEX: 27.66 KG/M2

## 2022-07-27 DIAGNOSIS — Z12.11 COLON CANCER SCREENING: ICD-10-CM

## 2022-07-27 DIAGNOSIS — K21.9 GASTROESOPHAGEAL REFLUX DISEASE, UNSPECIFIED WHETHER ESOPHAGITIS PRESENT: ICD-10-CM

## 2022-07-27 PROBLEM — Z98.84 H/O BARIATRIC SURGERY: Status: ACTIVE | Noted: 2022-07-27

## 2022-07-27 PROCEDURE — 88305 TISSUE EXAM BY PATHOLOGIST: CPT | Performed by: PATHOLOGY

## 2022-07-27 PROCEDURE — 45385 COLONOSCOPY W/LESION REMOVAL: CPT | Performed by: INTERNAL MEDICINE

## 2022-07-27 PROCEDURE — 43239 EGD BIOPSY SINGLE/MULTIPLE: CPT | Performed by: INTERNAL MEDICINE

## 2022-07-27 RX ORDER — LIDOCAINE HYDROCHLORIDE 20 MG/ML
INJECTION, SOLUTION EPIDURAL; INFILTRATION; INTRACAUDAL; PERINEURAL AS NEEDED
Status: DISCONTINUED | OUTPATIENT
Start: 2022-07-27 | End: 2022-07-27

## 2022-07-27 RX ORDER — SODIUM CHLORIDE, SODIUM LACTATE, POTASSIUM CHLORIDE, CALCIUM CHLORIDE 600; 310; 30; 20 MG/100ML; MG/100ML; MG/100ML; MG/100ML
100 INJECTION, SOLUTION INTRAVENOUS CONTINUOUS
Status: DISCONTINUED | OUTPATIENT
Start: 2022-07-27 | End: 2022-07-27

## 2022-07-27 RX ORDER — PROPOFOL 10 MG/ML
INJECTION, EMULSION INTRAVENOUS CONTINUOUS PRN
Status: DISCONTINUED | OUTPATIENT
Start: 2022-07-27 | End: 2022-07-27

## 2022-07-27 RX ORDER — PROPOFOL 10 MG/ML
INJECTION, EMULSION INTRAVENOUS AS NEEDED
Status: DISCONTINUED | OUTPATIENT
Start: 2022-07-27 | End: 2022-07-27

## 2022-07-27 RX ADMIN — PROPOFOL 50 MG: 10 INJECTION, EMULSION INTRAVENOUS at 11:54

## 2022-07-27 RX ADMIN — LIDOCAINE HYDROCHLORIDE 70 MG: 20 INJECTION, SOLUTION EPIDURAL; INFILTRATION; INTRACAUDAL; PERINEURAL at 11:51

## 2022-07-27 RX ADMIN — SODIUM CHLORIDE, SODIUM LACTATE, POTASSIUM CHLORIDE, AND CALCIUM CHLORIDE: .6; .31; .03; .02 INJECTION, SOLUTION INTRAVENOUS at 11:48

## 2022-07-27 RX ADMIN — PROPOFOL 100 MG: 10 INJECTION, EMULSION INTRAVENOUS at 11:51

## 2022-07-27 RX ADMIN — SODIUM CHLORIDE, SODIUM LACTATE, POTASSIUM CHLORIDE, AND CALCIUM CHLORIDE: .6; .31; .03; .02 INJECTION, SOLUTION INTRAVENOUS at 12:07

## 2022-07-27 RX ADMIN — PROPOFOL 50 MG: 10 INJECTION, EMULSION INTRAVENOUS at 11:56

## 2022-07-27 RX ADMIN — PROPOFOL 130 MCG/KG/MIN: 10 INJECTION, EMULSION INTRAVENOUS at 11:58

## 2022-07-27 NOTE — ANESTHESIA PREPROCEDURE EVALUATION
Procedure:  COLONOSCOPY  EGD    Relevant Problems   ANESTHESIA (within normal limits)      CARDIO   (+) Primary hypertension      GI/HEPATIC   (+) H/O bariatric surgery      PULMONARY (within normal limits)  prior dx BELLA/CPAP - now s/p gastric bypass and resolved   (-) Smoking   (-) URI (upper respiratory infection)      Physical Exam    Airway    Mallampati score: I  TM Distance: >3 FB  Neck ROM: full     Dental   Comment: Crowns, none loose, No notable dental hx     Cardiovascular      Pulmonary      Other Findings         Lab Results   Component Value Date    SODIUM 142 02/01/2022    K 4 7 02/01/2022    BUN 14 02/01/2022    CREATININE 0 71 02/01/2022    EGFR 91 08/31/2021     Anesthesia Plan  ASA Score- 2     Anesthesia Type- IV sedation with anesthesia with ASA Monitors  Additional Monitors:   Airway Plan:           Plan Factors-Exercise tolerance (METS): >4 METS  Chart reviewed  Existing labs reviewed  Patient summary reviewed  Patient is not a current smoker  Induction- intravenous  Postoperative Plan-     Informed Consent- Anesthetic plan and risks discussed with patient  I personally reviewed this patient with the CRNA  Discussed and agreed on the Anesthesia Plan with the CRNA  Zaina Phillips

## 2022-07-27 NOTE — ANESTHESIA POSTPROCEDURE EVALUATION
Post-Op Assessment Note    CV Status:  Stable    Pain management: adequate     Mental Status:  Alert and awake   Hydration Status:  Euvolemic   PONV Controlled:  Controlled   Airway Patency:  Patent      Post Op Vitals Reviewed: Yes      Staff: CRNA         No complications documented      BP   120/70   Temp  97 3   Pulse  66   Resp   16   SpO2   98%

## 2022-07-27 NOTE — H&P
History and Physical -  [facility]  Aline Dailey 65 y o  female MRN: [id number]    HPI: Aline Dailey is a 65y o  year old female who presents for evaluation of GERD and evaluation of history of polyps  Review of Systems    Historical Information   Past Medical History:   Diagnosis Date    Broken toe     COVID     High cholesterol     HPV in female     Hypertension      Past Surgical History:   Procedure Laterality Date    ABDOMINAL SURGERY      bariatric surgery     BREAST BIOPSY Right 07/25/2022    CHOLECYSTECTOMY      COLONOSCOPY      OVARY SURGERY Right      Social History   Social History     Substance and Sexual Activity   Alcohol Use Yes    Comment: Social - few times a week     Social History     Substance and Sexual Activity   Drug Use Never     Social History     Tobacco Use   Smoking Status Never Smoker   Smokeless Tobacco Never Used     Family History   Problem Relation Age of Onset    Diabetes Mother     Pancreatic cancer Mother     Prostate cancer Father        Meds/Allergies     (Not in a hospital admission)      No Known Allergies    Objective     /87   Pulse 63   Temp (!) 97 3 °F (36 3 °C) (Temporal)   Resp 16   Ht 5' 4" (1 626 m)   Wt 73 5 kg (162 lb)   SpO2 97%   BMI 27 81 kg/m²       PHYSICAL EXAM    Gen: NAD  CV: RRR  CHEST: Clear  ABD: soft, NT/ND  EXT: no edema  Neuro: AAO      ASSESSMENT/PLAN:  This is a 65y o  year old female here for colon polyps and GERD  PLAN:   Procedure:  EGD and colonoscopy

## 2022-08-26 DIAGNOSIS — K21.9 GASTROESOPHAGEAL REFLUX DISEASE WITHOUT ESOPHAGITIS: ICD-10-CM

## 2022-08-26 RX ORDER — PANTOPRAZOLE SODIUM 40 MG/1
TABLET, DELAYED RELEASE ORAL
Qty: 90 TABLET | Refills: 0 | Status: SHIPPED | OUTPATIENT
Start: 2022-08-26

## 2022-09-05 ENCOUNTER — NURSE TRIAGE (OUTPATIENT)
Dept: OTHER | Facility: OTHER | Age: 57
End: 2022-09-05

## 2022-09-05 NOTE — TELEPHONE ENCOUNTER
Regarding: BP question   ----- Message from [facility] sent at 9/5/2022  7:12 PM EDT -----  "My BP was 80/60, now is 110/70, should I take my medication before surgery"

## 2022-09-06 NOTE — TELEPHONE ENCOUNTER
Pt has procedure scheduled tomorrow for port placement  Today held all 3 bp meds d/t hypotension since last week  BP currently 115/88  On call provider contacted and recommends take half of metoprolol, check bp, and hold other medications  If bp is greater than >130/85 pt instructed to take entire metoprolol pill  Pt informed and verbalized understanding

## 2022-09-06 NOTE — TELEPHONE ENCOUNTER
Reason for Disposition   [1] Caller has URGENT medicine question about med that PCP or specialist prescribed AND [2] triager unable to answer question    Answer Assessment - Initial Assessment Questions  1  NAME of MEDICATION: "What medicine are you calling about?"      Amlodipine 10mg, lisinopril 20mg, metoprolol 50mg    2  QUESTION: "What is your question?" (e g , medication refill, side effect)      Ongoing issues with hypotension  Continue to hold these medications d/t hypotension pre-op? 3  PRESCRIBING HCP: "Who prescribed it?" Reason: if prescribed by specialist, call should be referred to that group  [place]    4  SYMPTOMS: "Do you have any symptoms?"      Denies s/s currently   bp now 115/88    Protocols used: MEDICATION QUESTION CALL-ADULT-

## 2022-09-19 ENCOUNTER — OFFICE VISIT (OUTPATIENT)
Dept: CARDIOLOGY CLINIC | Facility: CLINIC | Age: 57
End: 2022-09-19
Payer: COMMERCIAL

## 2022-09-19 VITALS
BODY MASS INDEX: 28.42 KG/M2 | DIASTOLIC BLOOD PRESSURE: 80 MMHG | SYSTOLIC BLOOD PRESSURE: 118 MMHG | WEIGHT: 166.5 LBS | HEART RATE: 68 BPM | HEIGHT: 64 IN

## 2022-09-19 DIAGNOSIS — I10 PRIMARY HYPERTENSION: Primary | ICD-10-CM

## 2022-09-19 DIAGNOSIS — R00.2 PALPITATIONS: ICD-10-CM

## 2022-09-19 PROCEDURE — 99214 OFFICE O/P EST MOD 30 MIN: CPT | Performed by: INTERNAL MEDICINE

## 2022-09-19 NOTE — PROGRESS NOTES
Cardiology   Monique Encinas 65 y o  female MRN: [id number]          Impression:  1  Hypertension - controlled  2  Fluttering in chest - possibly short runs of supraventricular tachycardia  Benign          Recommendations:  1  Continue current medications, except switch metoprolol to PM dosing  2  Follow up in 6 months  HPI: Monique Encinas is a 65y o  year old female with hypertension and dyslipidemia who presents for follow up  Normal echo 8/22  Had Zio monitor 1/22 which demonstrated short runs of possible atrial tachycardia  No chest pain or shortness of breath  Recently diagnosed with breast cancer - on chemotherapy  Review of Systems   Constitutional: Positive for fatigue  HENT: Negative  Eyes: Negative  Respiratory: Negative for chest tightness and shortness of breath  Cardiovascular: Negative for chest pain, palpitations and leg swelling  Gastrointestinal: Negative  Endocrine: Negative  Genitourinary: Negative  Musculoskeletal: Negative  Skin: Negative  Allergic/Immunologic: Negative  Neurological: Negative  Hematological: Negative  Psychiatric/Behavioral: Negative  All other systems reviewed and are negative          Past Medical History:   Diagnosis Date    Broken toe     COVID     High cholesterol     HPV in female     Hypertension      Past Surgical History:   Procedure Laterality Date    ABDOMINAL SURGERY      bariatric surgery     BREAST BIOPSY Right 07/25/2022    CHOLECYSTECTOMY      COLONOSCOPY      OVARY SURGERY Right      Social History     Substance and Sexual Activity   Alcohol Use Yes    Comment: Social - few times a week     Social History     Substance and Sexual Activity   Drug Use Never     Social History     Tobacco Use   Smoking Status Never Smoker   Smokeless Tobacco Never Used     Family History   Problem Relation Age of Onset    Diabetes Mother     Pancreatic cancer Mother     Prostate cancer Father Allergies:  No Known Allergies    Medications:     Current Outpatient Medications:     ALPRAZolam (XANAX) 0 25 mg tablet, alprazolam 0 25 mg tablet  TAKE 1 TABLET BY MOUTH AT BEDTIME AS NEEDED, Disp: , Rfl:     lisinopril (ZESTRIL) 20 mg tablet, Take 1 tablet (20 mg total) by mouth daily, Disp: 90 tablet, Rfl: 3    methylPREDNISolone 4 MG tablet therapy pack, Use as directed on package, Disp: 1 each, Rfl: 0    metoprolol succinate (TOPROL-XL) 50 mg 24 hr tablet, Take 50 mg by mouth daily, Disp: , Rfl:     pantoprazole (PROTONIX) 40 mg tablet, TAKE 1 TABLET BY MOUTH DAILY BEFORE BREAKFAST, Disp: 90 tablet, Rfl: 0    traZODone (DESYREL) 100 mg tablet, Take 1 tablet by mouth daily Takes for sleeping  , Disp: , Rfl:     NON FORMULARY, Takes Doterra serenity at night for sleep  otc drug (Patient not taking: Reported on 9/19/2022), Disp: , Rfl:       Wt Readings from Last 3 Encounters:   09/19/22 75 5 kg (166 lb 8 oz)   07/27/22 73 5 kg (162 lb)   05/24/22 77 1 kg (170 lb)     Temp Readings from Last 3 Encounters:   07/27/22 (!) 97 3 °F (36 3 °C) (Temporal)   05/24/22 97 7 °F (36 5 °C)   08/31/21 98 3 °F (36 8 °C) (Oral)     BP Readings from Last 3 Encounters:   09/19/22 118/80   07/27/22 129/66   05/24/22 111/76     Pulse Readings from Last 3 Encounters:   09/19/22 68   07/27/22 65   05/24/22 73         Physical Exam  HENT:      Head: Atraumatic  Mouth/Throat:      Mouth: Mucous membranes are moist    Eyes:      Extraocular Movements: Extraocular movements intact  Cardiovascular:      Rate and Rhythm: Normal rate and regular rhythm  Heart sounds: Normal heart sounds  Pulmonary:      Effort: Pulmonary effort is normal       Breath sounds: Normal breath sounds  Abdominal:      General: Abdomen is flat  Musculoskeletal:         General: Normal range of motion  Cervical back: Normal range of motion  Skin:     General: Skin is warm  Neurological:      General: No focal deficit present  Mental Status: She is alert and oriented to person, place, and time  Psychiatric:         Mood and Affect: Mood normal          Behavior: Behavior normal            Laboratory Studies:  CMP:  Lab Results   Component Value Date    K 4 7 02/01/2022     02/01/2022    CO2 25 02/01/2022    BUN 14 02/01/2022    CREATININE 0 71 02/01/2022    AST 21 02/01/2022    ALT 14 02/01/2022    EGFR 91 08/31/2021       Lipid Profile:   No results found for: CHOL  No results found for: HDL  No results found for: LDLCALC  No results found for: TRIG    Cardiac testing:   EKG reviewed personally:   No results found for this or any previous visit  No results found for this or any previous visit  No results found for this or any previous visit  No results found for this or any previous visit

## 2022-09-19 NOTE — PATIENT INSTRUCTIONS
Recommendations:  1  Continue current medications, except switch metoprolol to PM dosing  2  Follow up in 6 months

## 2022-10-03 ENCOUNTER — TELEPHONE (OUTPATIENT)
Dept: GASTROENTEROLOGY | Facility: CLINIC | Age: 57
End: 2022-10-03

## 2022-10-03 NOTE — TELEPHONE ENCOUNTER
Patients GI provider:  Dr Jana Reina    Number to return call: [phone]    Reason for call: Pt calling stating her oncologist would like her to be seen ASAP with Dr Jana Reina for a sore that has developed near her rectum  Pt stated she is on chemotherapy  She advised her oncologist wanted it stressed to Dr Jana Reina that she is on chemo and needs to be seen asap  Soonest available apt with Dr Jana Reina was next week in [place] office       Scheduled procedure/appointment date if applicable: Apt 19/09

## 2022-10-03 NOTE — TELEPHONE ENCOUNTER
Spoke with patient, she informed me she has [facility] at [employer] [facility] in [facility]  Said she could come in first thing in morning  I put her on for 7:45, she is aware she is an overbook

## 2022-10-04 ENCOUNTER — OFFICE VISIT (OUTPATIENT)
Dept: GASTROENTEROLOGY | Facility: CLINIC | Age: 57
End: 2022-10-04
Payer: COMMERCIAL

## 2022-10-04 VITALS
DIASTOLIC BLOOD PRESSURE: 79 MMHG | BODY MASS INDEX: 28.34 KG/M2 | HEIGHT: 64 IN | WEIGHT: 166 LBS | OXYGEN SATURATION: 98 % | TEMPERATURE: 97.5 F | HEART RATE: 82 BPM | SYSTOLIC BLOOD PRESSURE: 102 MMHG

## 2022-10-04 DIAGNOSIS — K62.9 PERIANAL LESION: Primary | ICD-10-CM

## 2022-10-04 DIAGNOSIS — D12.6 TUBULAR ADENOMA OF COLON: ICD-10-CM

## 2022-10-04 DIAGNOSIS — K21.9 GASTROESOPHAGEAL REFLUX DISEASE WITHOUT ESOPHAGITIS: ICD-10-CM

## 2022-10-04 PROCEDURE — 99214 OFFICE O/P EST MOD 30 MIN: CPT | Performed by: INTERNAL MEDICINE

## 2022-10-04 RX ORDER — LIDOCAINE HYDROCHLORIDE 20 MG/ML
5 SOLUTION OROPHARYNGEAL
COMMUNITY
Start: 2022-09-02

## 2022-10-04 RX ORDER — ONDANSETRON HYDROCHLORIDE 8 MG/1
TABLET, FILM COATED ORAL
COMMUNITY
Start: 2022-09-15

## 2022-10-04 NOTE — PROGRESS NOTES
[facility]  Progress Note - Nciky Rubin 65 y o  female MRN: [id number]    Unit/Bed#:  Encounter: [id number]    Assessment/Plan:    1  Perianal lesion-likely folliculitis-does not appear to be an abscess or a fistula, however she does have history of HPV-therefore would recommend follow-up with Colorectal surgery  Rectal exam today otherwise did not reveal any anal fissure, but was notable for internal hemorrhoids  -recommend to avoid straining, avoid constipation  2  GERD in setting of hiatal hernia, symptoms controlled with pantoprazole 40 mg at this time  EGD notable for hiatal hernia but no evidence of Edward's esophagus  Would ultimately recommend tapering off the PPI and switching to Pepcid however patient reports that she would like to continue on a PPI given that she is currently undergoing chemotherapy  Continue to follow anti-reflux measures  3  Tubular adenoma-repeat colonoscopy in 5 years  Subjective: This is a 78-year-old female with history of gastric bypass, hypertension, hyperlipidemia, recently diagnosed with breast cancer, currently undergoing chemotherapy at [facility] presents for urgent visit for perianal lesion  Patient referred by her oncologist for urgent evaluation  Patient reports some irritation in the perianal area and redness, denies any discharge  No rectal pain or rectal bleeding  She reports she has started noticing this over the weekend  She does have history of cervical HPV  She underwent EGD and colonoscopy at the end of July  Colonoscopy was notable for diverticulosis, tubular adenoma and hemorrhoids  There was no perianal lesion noted on exam at the time  Patient was recommended repeat colonoscopy at 5 year interval   EGD was notable for mild gastritis only  She has history of gastric bypass anatomy  Was also evidence of hiatal hernia  Patient was recommended pantoprazole    Patient reports that she has been taking this and is doing well  She wishes to continue this until she is going through chemotherapy and will eventually plan on switching to Pepcid  Objective:     Vitals: Blood pressure 102/79, pulse 82, temperature 97 5 °F (36 4 °C), height 5' 4" (1 626 m), weight 75 3 kg (166 lb), SpO2 98 %  ,Body mass index is 28 49 kg/m²  [unfilled]    Physical Exam:    GEN: wn/wd, NAD  HEENT: MMM,  anciteric  SKIN:  See below  NEURO: aaox3  Rectal:  2 mm area of erythema noted in the perianal lesion, without any discharge or break, no bleeding  Invasive Devices  Report    None                         Lab, Imaging and other studies:     No visits with results within 1 Day(s) from this visit  Latest known visit with results is:   Hospital Outpatient Visit on 07/27/2022   Component Date Value    Case Report 07/27/2022                      Value:Surgical Pathology Report                         Case: [id number]                                   Authorizing Provider:  Angelika Gay MD       Collected:           07/27/2022 1155              Ordering Location:     [address] [place] [address]        Received:            07/27/2022 [address] [place] [address]                                                    Pathologist:           Melonie Mercer DO                                                             Specimens:   A) - Stomach, Gastric bx                                                                            B) - Polyp, Colorectal, Sigmoid polyp cold snare                                           Final Diagnosis 07/27/2022                      Value: This result contains rich text formatting which cannot be displayed here   Additional Information 07/27/2022                      Value: This result contains rich text formatting which cannot be displayed here      Synoptic Checklist 07/27/2022                      Value:                            COLON/RECTUM POLYP FORM - GI - B                                                                                     :    Adenoma(s)      Gross Description 07/27/2022                      Value: This result contains rich text formatting which cannot be displayed here   Clinical Information 07/27/2022                      Value:R/o h pylori         I have personally reviewed pertinent films in PACS    No current facility-administered medications for this visit

## 2022-10-04 NOTE — LETTER
October 4, 2022     Leopold Calix, DO  [address]    Patient: Mazin Berg   YOB: 1965   Date of Visit: 10/4/2022       Dear Dr Xena May:    Thank you for referring Musa Kennedy to me for evaluation  Below are my notes for this consultation  If you have questions, please do not hesitate to call me  I look forward to following your patient along with you  Sincerely,        Radha Magallanes MD        CC: No Recipients  Radha Magallanes MD  10/4/2022  9:24 AM  Sign when Signing Visit  [facility]) [facility]  Progress Note - Mazin Berg 65 y o  female MRN: [id number]    Unit/Bed#:  Encounter: [id number]    Assessment/Plan:    1  Perianal lesion-likely folliculitis-does not appear to be an abscess or a fistula, however she does have history of HPV-therefore would recommend follow-up with Colorectal surgery  Rectal exam today otherwise did not reveal any anal fissure, but was notable for internal hemorrhoids  -recommend to avoid straining, avoid constipation  2  GERD in setting of hiatal hernia, symptoms controlled with pantoprazole 40 mg at this time  EGD notable for hiatal hernia but no evidence of Edward's esophagus  Would ultimately recommend tapering off the PPI and switching to Pepcid however patient reports that she would like to continue on a PPI given that she is currently undergoing chemotherapy  Continue to follow anti-reflux measures  3  Tubular adenoma-repeat colonoscopy in 5 years  Subjective: This is a 50-year-old female with history of gastric bypass, hypertension, hyperlipidemia, recently diagnosed with breast cancer, currently undergoing chemotherapy at [facility] presents for urgent visit for perianal lesion  Patient referred by her oncologist for urgent evaluation  Patient reports some irritation in the perianal area and redness, denies any discharge    No rectal pain or rectal bleeding  She reports she has started noticing this over the weekend  She does have history of cervical HPV  She underwent EGD and colonoscopy at the end of July  Colonoscopy was notable for diverticulosis, tubular adenoma and hemorrhoids  There was no perianal lesion noted on exam at the time  Patient was recommended repeat colonoscopy at 5 year interval   EGD was notable for mild gastritis only  She has history of gastric bypass anatomy  Was also evidence of hiatal hernia  Patient was recommended pantoprazole  Patient reports that she has been taking this and is doing well  She wishes to continue this until she is going through chemotherapy and will eventually plan on switching to Pepcid  Objective:     Vitals: Blood pressure 102/79, pulse 82, temperature 97 5 °F (36 4 °C), height 5' 4" (1 626 m), weight 75 3 kg (166 lb), SpO2 98 %  ,Body mass index is 28 49 kg/m²  [unfilled]    Physical Exam:    GEN: wn/wd, NAD  HEENT: MMM,  anciteric  SKIN:  See below  NEURO: aaox3  Rectal:  2 mm area of erythema noted in the perianal lesion, without any discharge or break, no bleeding  Invasive Devices  Report    None                         Lab, Imaging and other studies:     No visits with results within 1 Day(s) from this visit     Latest known visit with results is:   Hospital Outpatient Visit on 07/27/2022   Component Date Value    Case Report 07/27/2022                      Value:Surgical Pathology Report                         Case: [id number]                                   Authorizing Provider:  Janki Loo MD       Collected:           07/27/2022 1155              Ordering Location:     [facility]        Received:            07/27/2022 [address]                                                    Pathologist:           Femi Beckwith DO                                                             Specimens:   A) - Stomach, Gastric bx                                                                            B) - Polyp, Colorectal, Sigmoid polyp cold snare                                           Final Diagnosis 07/27/2022                      Value: This result contains rich text formatting which cannot be displayed here   Additional Information 07/27/2022                      Value: This result contains rich text formatting which cannot be displayed here   Synoptic Checklist 07/27/2022                      Value:                            COLON/RECTUM POLYP FORM - GI - B                                                                                     :    Adenoma(s)      Gross Description 07/27/2022                      Value: This result contains rich text formatting which cannot be displayed here   Clinical Information 07/27/2022                      Value:R/o h pylori         I have personally reviewed pertinent films in PACS    No current facility-administered medications for this visit

## 2022-10-12 PROBLEM — Z12.11 COLON CANCER SCREENING: Status: RESOLVED | Noted: 2021-12-17 | Resolved: 2022-10-12

## 2022-10-21 DIAGNOSIS — I10 PRIMARY HYPERTENSION: Primary | ICD-10-CM

## 2022-10-21 RX ORDER — METOPROLOL SUCCINATE 50 MG/1
50 TABLET, EXTENDED RELEASE ORAL DAILY
Qty: 30 TABLET | Refills: 11 | Status: SHIPPED | OUTPATIENT
Start: 2022-10-21 | End: 2023-10-21

## 2022-11-02 ENCOUNTER — TELEPHONE (OUTPATIENT)
Dept: CARDIOLOGY CLINIC | Facility: MEDICAL CENTER | Age: 57
End: 2022-11-02

## 2022-11-27 DIAGNOSIS — I10 PRIMARY HYPERTENSION: ICD-10-CM

## 2022-11-28 DIAGNOSIS — K21.9 GASTROESOPHAGEAL REFLUX DISEASE WITHOUT ESOPHAGITIS: ICD-10-CM

## 2022-11-28 RX ORDER — PANTOPRAZOLE SODIUM 40 MG/1
TABLET, DELAYED RELEASE ORAL
Qty: 90 TABLET | Refills: 0 | Status: SHIPPED | OUTPATIENT
Start: 2022-11-28

## 2022-11-28 RX ORDER — LISINOPRIL 20 MG/1
TABLET ORAL
Qty: 90 TABLET | Refills: 2 | Status: SHIPPED | OUTPATIENT
Start: 2022-11-28

## 2023-03-01 DIAGNOSIS — K21.9 GASTROESOPHAGEAL REFLUX DISEASE WITHOUT ESOPHAGITIS: ICD-10-CM

## 2023-03-01 RX ORDER — PANTOPRAZOLE SODIUM 40 MG/1
TABLET, DELAYED RELEASE ORAL
Qty: 90 TABLET | Refills: 0 | Status: SHIPPED | OUTPATIENT
Start: 2023-03-01

## 2023-08-23 ENCOUNTER — OFFICE VISIT (OUTPATIENT)
Dept: OBGYN CLINIC | Facility: CLINIC | Age: 58
End: 2023-08-23
Payer: COMMERCIAL

## 2023-08-23 VITALS — WEIGHT: 167 LBS | BODY MASS INDEX: 28.51 KG/M2 | HEIGHT: 64 IN

## 2023-08-23 DIAGNOSIS — M72.0 DUPUYTREN'S CONTRACTURE OF BOTH HANDS: Primary | ICD-10-CM

## 2023-08-23 PROCEDURE — 99214 OFFICE O/P EST MOD 30 MIN: CPT | Performed by: SURGERY

## 2023-08-23 NOTE — PROGRESS NOTES
Elsy Hines M.D. Attending, Orthopaedic Surgery  Hand, Wrist, and Elbow Surgery  [facility]      ORTHOPAEDIC HAND, WRIST, AND ELBOW OFFICE  VISIT       ASSESSMENT/PLAN:      55-year-old female here for Dupuytren's cords of the bilateral small fingers, worse on the left    Patient has had progressive worsening of bilateral small finger contractures at the PIP joints. Left side is about 30 degrees of contracture, right is about 10. We discussed treatment options for the left side as this is the most bothersome including Xiaflex injections versus surgical releases. At this time I do feel her contracture is very amenable to Xiaflex injections which is significantly less invasive than the surgical option. Patient was agreeable and prior authorization was started today. Patient is about to undergo 6 months of chemo treatment, advised not starting the Xiaflex injection until after this course is completed as this can spark an immune reaction. the patient verbalized understanding of exam findings and treatment plan. We engaged in the shared decision-making process and treatment options were discussed at length with the patient. Surgical and conservative management discussed today along with risks and benefits. Diagnoses and all orders for this visit:    Dupuytren's contracture of both hands        Follow Up:  Return for xiaflex. To Do Next Visit:  Re-evaluation of current issue      ____________________________________________________________________________________________________________________________________________      CHIEF COMPLAINT:  Chief Complaint   Patient presents with   • Left Hand - Follow-up     Dupuytren's        SUBJECTIVE:  Radha Doll is a 63y.o. year old RHD female who presents today for follow up evaluation of Dupuytren's disease of the bilateral palms.   Patient is previously seen and evaluated 1 year ago and did not have any contractures of the hands.  Since then she developed bilateral small finger PIP joint contractures, worse on the left than the right. She does have some intermittent soreness and pains over the left small finger, no history of injuries or surgeries on the hands.   She has not had prior Dupuytren's treatment      I have personally reviewed all the relevant PMH, PSH, SH, FH, Medications and allergies      PAST MEDICAL HISTORY:  Past Medical History:   Diagnosis Date   • Broken toe    • Cancer ([address])    • COVID    • High cholesterol    • HPV in female    • Hypertension        PAST SURGICAL HISTORY:  Past Surgical History:   Procedure Laterality Date   • ABDOMINAL SURGERY      bariatric surgery    • BREAST BIOPSY Right 07/25/2022   • CHOLECYSTECTOMY     • COLONOSCOPY     • IR PORT PLACEMENT     • OVARY SURGERY Right        FAMILY HISTORY:  Family History   Problem Relation Age of Onset   • Diabetes Mother    • Pancreatic cancer Mother    • Prostate cancer Father        SOCIAL HISTORY:  Social History     Tobacco Use   • Smoking status: Never   • Smokeless tobacco: Never   Vaping Use   • Vaping Use: Never used   Substance Use Topics   • Alcohol use: Not Currently     Comment: Social - few times a week   • Drug use: Never       MEDICATIONS:    Current Outpatient Medications:   •  ALPRAZolam (XANAX) 0.25 mg tablet, alprazolam 0.25 mg tablet  TAKE 1 TABLET BY MOUTH AT BEDTIME AS NEEDED, Disp: , Rfl:   •  metoprolol succinate (TOPROL-XL) 50 mg 24 hr tablet, Take 1 tablet (50 mg total) by mouth daily, Disp: 30 tablet, Rfl: 11  •  pantoprazole (PROTONIX) 40 mg tablet, TAKE 1 TABLET BY MOUTH EVERY DAY BEFORE BREAKFAST, Disp: 90 tablet, Rfl: 1  •  traZODone (DESYREL) 100 mg tablet, Take 1 tablet by mouth daily Takes for sleeping. , Disp: , Rfl:   •  bisacodyl (DULCOLAX) 5 mg EC tablet, Take 5 mg by mouth daily as needed for constipation Otc, Disp: , Rfl:   •  dexamethasone 0.5 mg/mL SUSP, Take 5 mL by mouth Dexamethasone/ Normal Saline 3.3mg/ 5ml (100mg/NS 150ml), Disp: , Rfl:   •  Lidocaine Viscous HCl (XYLOCAINE) 2 % mucosal solution, 5 mL, Disp: , Rfl:   •  lisinopril (ZESTRIL) 20 mg tablet, TAKE 1 TABLET BY MOUTH EVERY DAY, Disp: 90 tablet, Rfl: 2  •  methylPREDNISolone 4 MG tablet therapy pack, Use as directed on package, Disp: 1 each, Rfl: 0  •  NON FORMULARY, Takes Doterra serenity at night for sleep. otc drug, Disp: , Rfl:   •  ondansetron (ZOFRAN) 8 mg tablet, Take by mouth, Disp: , Rfl:     ALLERGIES:  Allergies   Allergen Reactions   • Alcohol - Food Allergy Anaphylaxis     Gas from wine            REVIEW OF SYSTEMS:  Review of Systems   Constitutional: Negative for chills, fever and unexpected weight change. HENT: Negative for hearing loss, nosebleeds and sore throat. Eyes: Negative for pain, redness and visual disturbance. Respiratory: Negative for cough, shortness of breath and wheezing. Cardiovascular: Negative for chest pain, palpitations and leg swelling. Gastrointestinal: Negative for abdominal pain and nausea. Genitourinary: Negative for dyspareunia, dysuria and frequency. Skin: Negative for rash and wound. Neurological: Negative for dizziness, numbness and headaches. Psychiatric/Behavioral: Negative for decreased concentration and suicidal ideas. The patient is not nervous/anxious. VITALS:  There were no vitals filed for this visit.     LABS:  HgA1c: No results found for: "HGBA1C"  BMP:   Lab Results   Component Value Date    CALCIUM 9.5 08/31/2021    K 4.7 02/01/2022    CO2 25 02/01/2022     02/01/2022    BUN 14 02/01/2022    CREATININE 0.71 02/01/2022       _____________________________________________________  PHYSICAL EXAMINATION:  General: well developed and well nourished, alert, oriented times 3 and appears comfortable  Psychiatric: Normal  HEENT: Normocephalic, Atraumatic Trachea Midline, No torticollis  Pulmonary: No audible wheezing or respiratory distress   Abdomen/GI: Non tender, non distended Cardiovascular: No pitting edema, 2+ radial pulse   Skin: No masses, erythema, lacerations, fluctation, ulcerations, Duypuytren's cords noted at the left ring and small fingers. Neurovascular: Sensation Intact to the Median, Ulnar, Radial Nerve, Motor Intact to the Median, Ulnar, Radial Nerve and Pulses Intact  Musculoskeletal: Normal, except as noted in detailed exam and in HPI. MUSCULOSKELETAL EXAMINATION:    Left hand:   Dupuytren's central cord noted over the left ring and small finger  Cord does run to the long finger  30 degree PIP contracture at small finger,  +table top test  No ring finger contracture   Full composite fist  Sensation intact to light touch     Right hand:   Small finger cord noted with about 10 degree PIP joint contracture   Full composite fist  ___________________________________________________  STUDIES REVIEWED:  No images reviewed at today's visit.        PROCEDURES PERFORMED:  Procedures  No Procedures performed today    _____________________________________________________      Scribe Attestation    I,:  Jose De Oliveira PA-C am acting as a scribe while in the presence of the attending physician.:       I,:  Elsy Hines MD personally performed the services described in this documentation    as scribed in my presence.:

## 2023-10-03 ENCOUNTER — OFFICE VISIT (OUTPATIENT)
Dept: CARDIOLOGY CLINIC | Facility: CLINIC | Age: 58
End: 2023-10-03
Payer: COMMERCIAL

## 2023-10-03 VITALS
OXYGEN SATURATION: 98 % | WEIGHT: 168.8 LBS | BODY MASS INDEX: 28.82 KG/M2 | HEART RATE: 72 BPM | SYSTOLIC BLOOD PRESSURE: 124 MMHG | HEIGHT: 64 IN | DIASTOLIC BLOOD PRESSURE: 90 MMHG

## 2023-10-03 DIAGNOSIS — R00.2 PALPITATIONS: ICD-10-CM

## 2023-10-03 DIAGNOSIS — I10 PRIMARY HYPERTENSION: Primary | ICD-10-CM

## 2023-10-03 PROCEDURE — 99214 OFFICE O/P EST MOD 30 MIN: CPT | Performed by: INTERNAL MEDICINE

## 2023-10-03 RX ORDER — LISINOPRIL 5 MG/1
5 TABLET ORAL DAILY
Qty: 90 TABLET | Refills: 3 | Status: SHIPPED | OUTPATIENT
Start: 2023-10-03

## 2023-10-03 NOTE — PROGRESS NOTES
Cardiology   Myles Patel 63 y.o. female MRN: [id number]        Impression:  1. Hypertension - borderline control. 2. Fluttering in chest - possibly short runs of supraventricular tachycardia. Benign.          Recommendations:  1. Start lisinopril 5mg daily. 2. Continue remainder of medications. 3. Follow up in one year. HPI: Myles Patel is a 63y.o. year old female with hypertension and dyslipidemia who presents for follow up.  Normal echo 8/22. Sharlyne Dub monitor 1/22 which demonstrated short runs of possible atrial tachycardia. No chest pain or shortness of breath. Underwent chemo due to breast cancer. Does have palpitations on occasion at night. Less than once a week. No chest pain or shortness of breath. Review of Systems   Constitutional: Negative. HENT: Negative. Eyes: Negative. Respiratory: Negative for chest tightness and shortness of breath. Cardiovascular: Negative for chest pain, palpitations and leg swelling. Gastrointestinal: Negative. Endocrine: Negative. Genitourinary: Negative. Musculoskeletal: Negative. Skin: Negative. Allergic/Immunologic: Negative. Neurological: Negative. Hematological: Negative. Psychiatric/Behavioral: Negative. All other systems reviewed and are negative.         Past Medical History:   Diagnosis Date   • Broken toe    • Cancer ([address])    • COVID    • High cholesterol    • HPV in female    • Hypertension      Past Surgical History:   Procedure Laterality Date   • ABDOMINAL SURGERY      bariatric surgery    • BREAST BIOPSY Right 07/25/2022   • CHOLECYSTECTOMY     • COLONOSCOPY     • IR PORT PLACEMENT     • OVARY SURGERY Right      Social History     Substance and Sexual Activity   Alcohol Use Not Currently    Comment: Social - few times a week     Social History     Substance and Sexual Activity   Drug Use Never     Social History     Tobacco Use   Smoking Status Never   Smokeless Tobacco Never     Family History Problem Relation Age of Onset   • Diabetes Mother    • Pancreatic cancer Mother    • Prostate cancer Father        Allergies: Allergies   Allergen Reactions   • Alcohol - Food Allergy Anaphylaxis     Gas from wine        Medications:     Current Outpatient Medications:   •  ALPRAZolam (XANAX) 0.25 mg tablet, alprazolam 0.25 mg tablet  TAKE 1 TABLET BY MOUTH AT BEDTIME AS NEEDED, Disp: , Rfl:   •  bisacodyl (DULCOLAX) 5 mg EC tablet, Take 5 mg by mouth daily as needed for constipation Otc, Disp: , Rfl:   •  metoprolol succinate (TOPROL-XL) 50 mg 24 hr tablet, Take 1 tablet (50 mg total) by mouth daily, Disp: 30 tablet, Rfl: 11  •  pantoprazole (PROTONIX) 40 mg tablet, TAKE 1 TABLET BY MOUTH EVERY DAY BEFORE BREAKFAST, Disp: 90 tablet, Rfl: 1  •  traZODone (DESYREL) 100 mg tablet, Take 1 tablet by mouth daily Takes for sleeping. , Disp: , Rfl:   •  dexamethasone 0.5 mg/mL SUSP, Take 5 mL by mouth Dexamethasone/ Normal Saline 3.3mg/ 5ml (100mg/NS 150ml) (Patient not taking: Reported on 10/3/2023), Disp: , Rfl:   •  Lidocaine Viscous HCl (XYLOCAINE) 2 % mucosal solution, 5 mL (Patient not taking: Reported on 10/3/2023), Disp: , Rfl:   •  lisinopril (ZESTRIL) 20 mg tablet, TAKE 1 TABLET BY MOUTH EVERY DAY (Patient not taking: Reported on 10/3/2023), Disp: 90 tablet, Rfl: 2  •  methylPREDNISolone 4 MG tablet therapy pack, Use as directed on package (Patient not taking: Reported on 10/3/2023), Disp: 1 each, Rfl: 0  •  NON FORMULARY, Takes Doterra serenity at night for sleep.  otc drug (Patient not taking: Reported on 10/3/2023), Disp: , Rfl:   •  ondansetron (ZOFRAN) 8 mg tablet, Take by mouth (Patient not taking: Reported on 10/3/2023), Disp: , Rfl:       Wt Readings from Last 3 Encounters:   10/03/23 76.6 kg (168 lb 12.8 oz)   08/23/23 75.8 kg (167 lb)   10/05/22 75.8 kg (167 lb)     Temp Readings from Last 3 Encounters:   10/04/22 97.5 °F (36.4 °C)   07/27/22 (!) 97.3 °F (36.3 °C) (Temporal)   05/24/22 97.7 °F (36.5 °C)     BP Readings from Last 3 Encounters:   10/03/23 124/90   10/04/22 102/79   09/19/22 118/80     Pulse Readings from Last 3 Encounters:   10/03/23 72   10/04/22 82   09/19/22 68         Physical Exam      Laboratory Studies:  CMP:  Lab Results   Component Value Date    K 4.7 02/01/2022     02/01/2022    CO2 25 02/01/2022    BUN 14 02/01/2022    CREATININE 0.71 02/01/2022    AST 21 02/01/2022    ALT 14 02/01/2022    EGFR 91 08/31/2021

## 2023-10-03 NOTE — PATIENT INSTRUCTIONS
Recommendations:  1. Start lisinopril 5mg daily. 2. Continue remainder of medications. 3. Follow up in one year.

## 2023-10-31 DIAGNOSIS — I10 PRIMARY HYPERTENSION: ICD-10-CM

## 2023-10-31 RX ORDER — METOPROLOL SUCCINATE 50 MG/1
50 TABLET, EXTENDED RELEASE ORAL DAILY
Qty: 30 TABLET | Refills: 11 | Status: SHIPPED | OUTPATIENT
Start: 2023-10-31

## 2023-12-01 DIAGNOSIS — K21.9 GASTROESOPHAGEAL REFLUX DISEASE WITHOUT ESOPHAGITIS: ICD-10-CM

## 2023-12-01 RX ORDER — PANTOPRAZOLE SODIUM 40 MG/1
TABLET, DELAYED RELEASE ORAL
Qty: 90 TABLET | Refills: 1 | Status: SHIPPED | OUTPATIENT
Start: 2023-12-01

## 2024-01-30 ENCOUNTER — OFFICE VISIT (OUTPATIENT)
Dept: URGENT CARE | Facility: MEDICAL CENTER | Age: 59
End: 2024-01-30
Payer: COMMERCIAL

## 2024-01-30 VITALS
SYSTOLIC BLOOD PRESSURE: 148 MMHG | BODY MASS INDEX: 30.22 KG/M2 | WEIGHT: 177 LBS | DIASTOLIC BLOOD PRESSURE: 92 MMHG | OXYGEN SATURATION: 96 % | TEMPERATURE: 97.9 F | HEART RATE: 66 BPM | HEIGHT: 64 IN | RESPIRATION RATE: 18 BRPM

## 2024-01-30 DIAGNOSIS — J02.9 ACUTE PHARYNGITIS, UNSPECIFIED ETIOLOGY: Primary | ICD-10-CM

## 2024-01-30 DIAGNOSIS — B34.9 VIRAL SYNDROME: ICD-10-CM

## 2024-01-30 PROBLEM — F41.8 ANXIETY ASSOCIATED WITH DEPRESSION: Status: ACTIVE | Noted: 2017-04-27

## 2024-01-30 PROBLEM — G25.81 RESTLESS LEG SYNDROME: Status: ACTIVE | Noted: 2017-04-27

## 2024-01-30 PROBLEM — R79.89 ABNORMAL LIVER FUNCTION TESTS: Status: ACTIVE | Noted: 2017-04-27

## 2024-01-30 PROBLEM — Z08 ENCOUNTER FOR FOLLOW-UP SURVEILLANCE OF BREAST CANCER: Status: ACTIVE | Noted: 2023-09-22

## 2024-01-30 PROBLEM — K21.9 GERD (GASTROESOPHAGEAL REFLUX DISEASE): Status: ACTIVE | Noted: 2022-12-02

## 2024-01-30 PROBLEM — G47.9 SLEEP DISTURBANCE: Status: ACTIVE | Noted: 2024-01-30

## 2024-01-30 PROBLEM — D61.818 PANCYTOPENIA (HCC): Status: ACTIVE | Noted: 2022-12-02

## 2024-01-30 PROBLEM — Z98.84 STATUS POST BARIATRIC SURGERY: Status: ACTIVE | Noted: 2017-04-27

## 2024-01-30 PROBLEM — Z86.73 HISTORY OF TRANSIENT ISCHEMIC ATTACK: Status: ACTIVE | Noted: 2017-04-27

## 2024-01-30 PROBLEM — R51.9 HEADACHE: Status: ACTIVE | Noted: 2023-06-21

## 2024-01-30 PROBLEM — Z85.3 ENCOUNTER FOR FOLLOW-UP SURVEILLANCE OF BREAST CANCER: Status: ACTIVE | Noted: 2023-09-22

## 2024-01-30 PROBLEM — G47.30 SLEEP APNEA: Status: ACTIVE | Noted: 2024-01-30

## 2024-01-30 PROBLEM — R01.1 SYSTOLIC MURMUR: Status: ACTIVE | Noted: 2024-01-30

## 2024-01-30 PROBLEM — L30.9 ECZEMA: Status: ACTIVE | Noted: 2017-10-18

## 2024-01-30 PROBLEM — E87.1 HYPONATREMIA: Status: ACTIVE | Noted: 2022-12-02

## 2024-01-30 PROBLEM — C50.919 BREAST CANCER (HCC): Status: ACTIVE | Noted: 2022-08-04

## 2024-01-30 PROBLEM — K76.0 FATTY LIVER DISEASE, NONALCOHOLIC: Status: ACTIVE | Noted: 2017-04-27

## 2024-01-30 PROBLEM — E78.00 HYPERCHOLESTEREMIA: Status: ACTIVE | Noted: 2024-01-30

## 2024-01-30 LAB — S PYO AG THROAT QL: NEGATIVE

## 2024-01-30 PROCEDURE — 87070 CULTURE OTHR SPECIMN AEROBIC: CPT

## 2024-01-30 PROCEDURE — 87636 SARSCOV2 & INF A&B AMP PRB: CPT

## 2024-01-30 PROCEDURE — 99213 OFFICE O/P EST LOW 20 MIN: CPT

## 2024-01-30 PROCEDURE — 87880 STREP A ASSAY W/OPTIC: CPT

## 2024-01-30 RX ORDER — CAPECITABINE 500 MG/1
TABLET, FILM COATED ORAL
COMMUNITY
Start: 2024-01-05

## 2024-01-30 RX ORDER — BENZONATATE 200 MG/1
200 CAPSULE ORAL 3 TIMES DAILY PRN
Qty: 20 CAPSULE | Refills: 0 | Status: SHIPPED | OUTPATIENT
Start: 2024-01-30

## 2024-01-30 NOTE — PROGRESS NOTES
[facility]        NAME: Norma Rojas is a 59 y.o. female  : 1965    MRN: [id number]  DATE: 2024  TIME: 2:25 PM    Assessment and Plan   Viral syndrome [B34.9]  1. Viral syndrome  Covid/Flu-Office Collect    benzonatate (TESSALON) 200 MG capsule      2. Acute pharyngitis, unspecified etiology  POCT rapid ANTIGEN strepA        POCT rapid strepA: Negative    Will send out for culture. If positive will treat with antibiotics.      PCR for COVID/Flu collected in clinic, will call patient if results are positive and pt did not view results on [facility] MyChart.     Patient Instructions   Most colds are caused by virus, which does not respond to antibiotics. Typically viruses are self limiting and will go away own their own. There are both over the counter medicines or prescriptions medicines that can be used to help with symptoms until the virus had a chance to run it's course.     Take Tessalon as prescribed  Vitamin D3 2000 IU daily  Vitamin C 1000mg twice per day  Multivitamin daily  Some studies suggest that Zinc 12.5-15mg every 2 hours while awake x 5 days may shorten the duration cold symptoms by 1-2 days.     Encourage fluids  Rest  Nasal saline spray  Over the counter decongestant; Afrin if severe congestion (do not use for more than 3 days)  Tylenol/Ibuprofen for pain/fever  Salt water gargles and chloraseptic spray  Tsp of honey  Warm tea with honey. May use lemon.  Throat lozenges  Throat Coat Tea  Warm compresses over sinuses  Steam treatment (utilize proper safety precautions when in contact with hot water/steam)    Call PCP if symptoms not improving after 10-12 days, for persistent fever (more than 4-5 days total), concerns about breathing, persistent ear pain or signs of dehydration (decreased urine, dry, cracked lips).    Follow up with PCP in 3-5 days.  Proceed to ER if symptoms worsen.    Chief Complaint     Chief Complaint   Patient presents with    Headache     Pt.  C/O headache, sore throat, and hoarse voice that began yesterday. No fevers. Home Covid test was negative yesterday     History of Present Illness       URI   This is a new problem. The current episode started yesterday. The problem has been gradually worsening. There has been no fever. Associated symptoms include diarrhea, headaches and a sore throat. Pertinent negatives include no abdominal pain, chest pain, congestion, coughing, ear pain, nausea, rhinorrhea, sinus pain, vomiting or wheezing.       Review of Systems   Review of Systems   Constitutional:  Positive for chills. Negative for diaphoresis, fatigue and fever.   HENT:  Positive for sore throat and voice change (Hoarse). Negative for congestion, ear discharge, ear pain, postnasal drip, rhinorrhea, sinus pressure and sinus pain.    Respiratory: Negative.  Negative for cough, shortness of breath and wheezing.    Cardiovascular: Negative.  Negative for chest pain and palpitations.   Gastrointestinal:  Positive for constipation and diarrhea. Negative for abdominal pain, nausea and vomiting.        Baseline constipation and diarrhea issues d/t chemotherapy    Musculoskeletal:  Positive for myalgias.   Skin: Negative.  Negative for color change and wound.   Neurological:  Positive for headaches. Negative for dizziness and light-headedness.         Current Medications       Current Outpatient Medications:     ALPRAZolam (XANAX) 0.25 mg tablet, alprazolam 0.25 mg tablet  TAKE 1 TABLET BY MOUTH AT BEDTIME AS NEEDED, Disp: , Rfl:     benzonatate (TESSALON) 200 MG capsule, Take 1 capsule (200 mg total) by mouth 3 (three) times a day as needed for cough, Disp: 20 capsule, Rfl: 0    bisacodyl (DULCOLAX) 5 mg EC tablet, Take 5 mg by mouth daily as needed for constipation Otc, Disp: , Rfl:     capecitabine (XELODA) 500 MG tablet, , Disp: , Rfl:     lisinopril (ZESTRIL) 5 mg tablet, Take 1 tablet (5 mg total) by mouth daily, Disp: 90 tablet, Rfl: 3    metoprolol succinate  "(TOPROL-XL) 50 mg 24 hr tablet, TAKE 1 TABLET BY MOUTH EVERY DAY, Disp: 30 tablet, Rfl: 11    traZODone (DESYREL) 100 mg tablet, Take 1 tablet by mouth daily Takes for sleeping. , Disp: , Rfl:     Current Allergies     Allergies as of 01/30/2024 - Reviewed 01/30/2024   Allergen Reaction Noted    Alcohol - food allergy Anaphylaxis 10/04/2022            The following portions of the patient's history were reviewed and updated as appropriate: allergies, current medications, past family history, past medical history, past social history, past surgical history and problem list.     Past Medical History:   Diagnosis Date    Broken toe     Cancer (HCC)     COVID     High cholesterol     HPV in female     Hypertension        Past Surgical History:   Procedure Laterality Date    ABDOMINAL SURGERY      bariatric surgery     BREAST BIOPSY Right 07/25/2022    CHOLECYSTECTOMY      COLONOSCOPY      IR PORT PLACEMENT      OVARY SURGERY Right        Family History   Problem Relation Age of Onset    Diabetes Mother     Pancreatic cancer Mother     Prostate cancer Father          Medications have been verified.        Objective   /92   Pulse 66   Temp 97.9 °F (36.6 °C)   Resp 18   Ht 5' 4\" (1.626 m)   Wt 80.3 kg (177 lb)   SpO2 96%   BMI 30.38 kg/m²        Physical Exam     Physical Exam  Vitals and nursing note reviewed.   Constitutional:       General: She is not in acute distress.     Appearance: Normal appearance. She is not ill-appearing, toxic-appearing or diaphoretic.   HENT:      Head: Normocephalic.      Right Ear: Tympanic membrane, ear canal and external ear normal. There is no impacted cerumen.      Left Ear: Tympanic membrane, ear canal and external ear normal. There is no impacted cerumen.      Nose: Nose normal. No congestion or rhinorrhea.      Mouth/Throat:      Mouth: Mucous membranes are moist.      Pharynx: Posterior oropharyngeal erythema (slight) present.   Cardiovascular:      Rate and Rhythm: " Normal rate and regular rhythm.      Pulses: Normal pulses.      Heart sounds: Normal heart sounds. No murmur heard.  Pulmonary:      Effort: Pulmonary effort is normal. No respiratory distress.      Breath sounds: Normal breath sounds. No stridor. No wheezing, rhonchi or rales.   Chest:      Chest wall: No tenderness.   Musculoskeletal:         General: Normal range of motion.   Lymphadenopathy:      Cervical: No cervical adenopathy.   Skin:     General: Skin is warm.   Neurological:      Mental Status: She is alert.

## 2024-01-30 NOTE — PATIENT INSTRUCTIONS
Most colds are caused by virus, which does not respond to antibiotics. Typically viruses are self limiting and will go away own their own. There are both over the counter medicines or prescriptions medicines that can be used to help with symptoms until the virus had a chance to run it's course.     Take Tessalon as prescribed  Vitamin D3 2000 IU daily  Vitamin C 1000mg twice per day  Multivitamin daily  Some studies suggest that Zinc 12.5-15mg every 2 hours while awake x 5 days may shorten the duration cold symptoms by 1-2 days.     Encourage fluids  Rest  Nasal saline spray  Over the counter decongestant; Afrin if severe congestion (do not use for more than 3 days)  Tylenol/Ibuprofen for pain/fever  Salt water gargles and chloraseptic spray  Tsp of honey  Warm tea with honey. May use lemon.  Throat lozenges  Throat Coat Tea  Warm compresses over sinuses  Steam treatment (utilize proper safety precautions when in contact with hot water/steam)    Call PCP if symptoms not improving after 10-12 days, for persistent fever (more than 4-5 days total), concerns about breathing, persistent ear pain or signs of dehydration (decreased urine, dry, cracked lips).    Follow up with PCP in 3-5 days.  Proceed to ER if symptoms worsen.

## 2024-01-31 LAB
FLUAV RNA RESP QL NAA+PROBE: NEGATIVE
FLUBV RNA RESP QL NAA+PROBE: NEGATIVE
SARS-COV-2 RNA RESP QL NAA+PROBE: NEGATIVE

## 2024-02-01 LAB — BACTERIA THROAT CULT: NORMAL

## 2024-02-28 ENCOUNTER — OFFICE VISIT (OUTPATIENT)
Dept: URGENT CARE | Facility: MEDICAL CENTER | Age: 59
End: 2024-02-28
Payer: COMMERCIAL

## 2024-02-28 VITALS
RESPIRATION RATE: 18 BRPM | WEIGHT: 170 LBS | TEMPERATURE: 98 F | DIASTOLIC BLOOD PRESSURE: 80 MMHG | OXYGEN SATURATION: 97 % | HEIGHT: 64 IN | HEART RATE: 84 BPM | SYSTOLIC BLOOD PRESSURE: 144 MMHG | BODY MASS INDEX: 29.02 KG/M2

## 2024-02-28 DIAGNOSIS — J06.9 ACUTE URI: Primary | ICD-10-CM

## 2024-02-28 LAB
S PYO AG THROAT QL: NEGATIVE
SARS-COV-2 AG UPPER RESP QL IA: NEGATIVE
VALID CONTROL: NORMAL

## 2024-02-28 PROCEDURE — 87070 CULTURE OTHR SPECIMN AEROBIC: CPT | Performed by: PHYSICIAN ASSISTANT

## 2024-02-28 PROCEDURE — 99213 OFFICE O/P EST LOW 20 MIN: CPT | Performed by: PHYSICIAN ASSISTANT

## 2024-02-28 PROCEDURE — 87811 SARS-COV-2 COVID19 W/OPTIC: CPT | Performed by: PHYSICIAN ASSISTANT

## 2024-02-28 PROCEDURE — 87880 STREP A ASSAY W/OPTIC: CPT | Performed by: PHYSICIAN ASSISTANT

## 2024-02-28 PROCEDURE — 87636 SARSCOV2 & INF A&B AMP PRB: CPT | Performed by: PHYSICIAN ASSISTANT

## 2024-02-28 RX ORDER — BENZONATATE 100 MG/1
100 CAPSULE ORAL 3 TIMES DAILY PRN
Qty: 20 CAPSULE | Refills: 0 | Status: SHIPPED | OUTPATIENT
Start: 2024-02-28

## 2024-02-28 NOTE — PATIENT INSTRUCTIONS
Upper respiratory infection  Humidifier in bedroom, steam from shower  Tessalon Perles as needed for cough  Follow up with PCP in 3-5 days.  Proceed to  ER if symptoms worsen.

## 2024-02-28 NOTE — PROGRESS NOTES
[facility]        NAME: Norma Rojas is a 59 y.o. female  : 1965    MRN: [id number]  DATE: 2024  TIME: 12:25 PM    Assessment and Plan   Acute URI [J06.9]  1. Acute URI              Patient Instructions     Upper respiratory infection  Humidifier in bedroom, steam from shower  Tessalon Perles as needed for cough  Follow up with PCP in 3-5 days.  Proceed to  ER if symptoms worsen.    Chief Complaint     Chief Complaint   Patient presents with    Cold Like Symptoms     Pt. With cough, congestion, runny nose, sore throat that began yesterday. No fevers at home.          History of Present Illness       59-year-old female with past medical history of breast cancer who presents complaining of cough, congestion x 24 hours.  Patient states that she wanted to be tested for COVID.  Denies chest pain, shortness of breath, fevers, chills.        Review of Systems   Review of Systems   Constitutional:  Negative for activity change, appetite change, chills, diaphoresis, fatigue and fever.   HENT:  Positive for congestion and rhinorrhea. Negative for ear discharge, ear pain, facial swelling, hearing loss, mouth sores, nosebleeds, postnasal drip, sinus pressure, sinus pain, sneezing, sore throat and voice change.    Respiratory:  Positive for cough. Negative for apnea, choking, chest tightness, shortness of breath, wheezing and stridor.    Cardiovascular: Negative.          Current Medications       Current Outpatient Medications:     ALPRAZolam (XANAX) 0.25 mg tablet, alprazolam 0.25 mg tablet  TAKE 1 TABLET BY MOUTH AT BEDTIME AS NEEDED, Disp: , Rfl:     capecitabine (XELODA) 500 MG tablet, , Disp: , Rfl:     lisinopril (ZESTRIL) 5 mg tablet, Take 1 tablet (5 mg total) by mouth daily, Disp: 90 tablet, Rfl: 3    metoprolol succinate (TOPROL-XL) 50 mg 24 hr tablet, TAKE 1 TABLET BY MOUTH EVERY DAY, Disp: 30 tablet, Rfl: 11    traZODone (DESYREL) 100 mg tablet, Take 1 tablet by mouth daily Takes  "for sleeping. , Disp: , Rfl:     benzonatate (TESSALON) 200 MG capsule, Take 1 capsule (200 mg total) by mouth 3 (three) times a day as needed for cough (Patient not taking: Reported on 2/28/2024), Disp: 20 capsule, Rfl: 0    bisacodyl (DULCOLAX) 5 mg EC tablet, Take 5 mg by mouth daily as needed for constipation Otc (Patient not taking: Reported on 2/28/2024), Disp: , Rfl:     Current Allergies     Allergies as of 02/28/2024 - Reviewed 02/28/2024   Allergen Reaction Noted    Alcohol - food allergy Anaphylaxis 10/04/2022            The following portions of the patient's history were reviewed and updated as appropriate: allergies, current medications, past family history, past medical history, past social history, past surgical history and problem list.     Past Medical History:   Diagnosis Date    Broken toe     Cancer (HCC)     COVID     High cholesterol     HPV in female     Hypertension        Past Surgical History:   Procedure Laterality Date    ABDOMINAL SURGERY      bariatric surgery     BREAST BIOPSY Right 07/25/2022    CHOLECYSTECTOMY      COLONOSCOPY      IR PORT PLACEMENT      OVARY SURGERY Right        Family History   Problem Relation Age of Onset    Diabetes Mother     Pancreatic cancer Mother     Prostate cancer Father          Medications have been verified.        Objective   /80   Pulse 84   Temp 98 °F (36.7 °C)   Resp 18   Ht 5' 4\" (1.626 m)   Wt 77.1 kg (170 lb)   SpO2 97%   BMI 29.18 kg/m²        Physical Exam     Physical Exam  Constitutional:       General: She is not in acute distress.     Appearance: Normal appearance. She is well-developed. She is not diaphoretic.   HENT:      Head: Normocephalic and atraumatic.      Right Ear: Hearing, tympanic membrane, ear canal and external ear normal.      Left Ear: Hearing, tympanic membrane, ear canal and external ear normal.      Nose: Rhinorrhea present.      Mouth/Throat:      Pharynx: Uvula midline.   Cardiovascular:      Rate and " Rhythm: Normal rate and regular rhythm.      Heart sounds: Normal heart sounds.   Pulmonary:      Effort: Pulmonary effort is normal. No respiratory distress.      Breath sounds: Normal breath sounds. No stridor. No wheezing, rhonchi or rales.   Chest:      Chest wall: No tenderness.   Musculoskeletal:      Cervical back: Normal range of motion and neck supple.   Lymphadenopathy:      Cervical: Cervical adenopathy present.   Neurological:      Mental Status: She is alert.

## 2024-03-01 LAB — BACTERIA THROAT CULT: NORMAL

## 2024-07-28 ENCOUNTER — APPOINTMENT (EMERGENCY)
Dept: RADIOLOGY | Facility: HOSPITAL | Age: 59
End: 2024-07-28
Payer: COMMERCIAL

## 2024-07-28 ENCOUNTER — HOSPITAL ENCOUNTER (EMERGENCY)
Facility: HOSPITAL | Age: 59
Discharge: HOME | End: 2024-07-28
Attending: EMERGENCY MEDICINE | Admitting: EMERGENCY MEDICINE
Payer: COMMERCIAL

## 2024-07-28 VITALS
BODY MASS INDEX: 29.48 KG/M2 | WEIGHT: 171.74 LBS | SYSTOLIC BLOOD PRESSURE: 121 MMHG | TEMPERATURE: 97.1 F | DIASTOLIC BLOOD PRESSURE: 65 MMHG | OXYGEN SATURATION: 97 % | RESPIRATION RATE: 16 BRPM | HEART RATE: 59 BPM

## 2024-07-28 DIAGNOSIS — S42.292A HUMERAL HEAD FRACTURE, LEFT, CLOSED, INITIAL ENCOUNTER: ICD-10-CM

## 2024-07-28 DIAGNOSIS — M25.512 LEFT SHOULDER PAIN: Primary | ICD-10-CM

## 2024-07-28 PROCEDURE — 99284 EMERGENCY DEPT VISIT MOD MDM: CPT

## 2024-07-28 PROCEDURE — 99283 EMERGENCY DEPT VISIT LOW MDM: CPT

## 2024-07-28 PROCEDURE — 73060 X-RAY EXAM OF HUMERUS: CPT

## 2024-07-28 PROCEDURE — 73030 X-RAY EXAM OF SHOULDER: CPT

## 2024-07-28 RX ORDER — OXYCODONE HYDROCHLORIDE 5 MG/1
5 TABLET ORAL EVERY 6 HOURS PRN
Qty: 5 TABLET | Refills: 0 | Status: SHIPPED | OUTPATIENT
Start: 2024-07-28

## 2024-07-28 RX ORDER — IBUPROFEN 600 MG/1
600 TABLET ORAL ONCE
Status: COMPLETED | OUTPATIENT
Start: 2024-07-28 | End: 2024-07-28

## 2024-07-28 RX ADMIN — IBUPROFEN 600 MG: 600 TABLET, FILM COATED ORAL at 10:35

## 2024-07-28 NOTE — ED PROVIDER NOTES
"History  Chief Complaint   Patient presents with    Shoulder Injury     Patients reports \"she was walking her dog yesterday on the leash and the dog pulled her causing her to fall, c/o of left shoulder pain\" denies head strike or LOC\"     Patient is a 59-year-old female with past medical history including high cholesterol, hypertension, and cancer.  Presents today for chief complaint of left shoulder pain.  States that she was walking her daughter's dog yesterday when he got excited and started to pull at the leash.  Patient states that she then fell onto grass on her left shoulder.  Reports pain that begins in the middle of her upper arm and extends up towards the shoulder.  Denies any previous injury or surgery to that arm.  No numbness or tingling.  Patient denies any head strike, loss of consciousness, or aspirin/thinner use.  Patient states that she had 2 oxycodone left over from prior surgery and she took 1 yesterday and 1 prior to coming in today with some relief.  Patient is right-handed.      Shoulder Injury  Associated symptoms: no fever        Prior to Admission Medications   Prescriptions Last Dose Informant Patient Reported? Taking?   ALPRAZolam (XANAX) 0.25 mg tablet  Self Yes No   Sig: alprazolam 0.25 mg tablet   TAKE 1 TABLET BY MOUTH AT BEDTIME AS NEEDED   benzonatate (TESSALON PERLES) 100 mg capsule   No No   Sig: Take 1 capsule (100 mg total) by mouth 3 (three) times a day as needed for cough   benzonatate (TESSALON) 200 MG capsule   No No   Sig: Take 1 capsule (200 mg total) by mouth 3 (three) times a day as needed for cough   Patient not taking: Reported on 2/28/2024   bisacodyl (DULCOLAX) 5 mg EC tablet  Self Yes No   Sig: Take 5 mg by mouth daily as needed for constipation Otc   Patient not taking: Reported on 2/28/2024   capecitabine (XELODA) 500 MG tablet   Yes No   lisinopril (ZESTRIL) 5 mg tablet   No No   Sig: Take 1 tablet (5 mg total) by mouth daily   metoprolol succinate (TOPROL-XL) 50 " mg 24 hr tablet   No No   Sig: TAKE 1 TABLET BY MOUTH EVERY DAY   traZODone (DESYREL) 100 mg tablet  Self Yes No   Sig: Take 1 tablet by mouth daily Takes for sleeping.       Facility-Administered Medications: None       Past Medical History:   Diagnosis Date    Broken toe     Cancer (HCC)     COVID     High cholesterol     HPV in female     Hypertension        Past Surgical History:   Procedure Laterality Date    ABDOMINAL SURGERY      bariatric surgery     BREAST BIOPSY Right 07/25/2022    CHOLECYSTECTOMY      COLONOSCOPY      IR PORT PLACEMENT      OVARY SURGERY Right        Family History   Problem Relation Age of Onset    Diabetes Mother     Pancreatic cancer Mother     Prostate cancer Father      I have reviewed and agree with the history as documented.    E-Cigarette/Vaping    E-Cigarette Use Never User      E-Cigarette/Vaping Substances    Nicotine No     THC No     CBD No     Flavoring No     Other No     Unknown No      Social History     Tobacco Use    Smoking status: Never    Smokeless tobacco: Never   Vaping Use    Vaping status: Never Used   Substance Use Topics    Alcohol use: Not Currently     Comment: Social - few times a week    Drug use: Never       Review of Systems   Constitutional:  Negative for chills and fever.   Respiratory:  Negative for shortness of breath.    Cardiovascular:  Negative for chest pain.   Musculoskeletal:  Positive for arthralgias.   Skin:  Negative for wound.   All other systems reviewed and are negative.      Physical Exam  Physical Exam  Vitals and nursing note reviewed.   Constitutional:       Appearance: Normal appearance.   HENT:      Head: Normocephalic and atraumatic.   Cardiovascular:      Pulses: Normal pulses.      Heart sounds: Normal heart sounds.   Pulmonary:      Breath sounds: Normal breath sounds.   Musculoskeletal:         General: Tenderness and signs of injury present.        Arms:       Comments: Tenderness upon palpation of the proximal and mid left  humerus.  No tenderness upon palpation of the left shoulder joint.  Neurovascular and sensation intact.  Radial pulse 2+.  Limited ROM of left arm and patient is unable to raise arm due to pain.    Skin:     General: Skin is warm and dry.      Capillary Refill: Capillary refill takes less than 2 seconds.      Comments: No ecchymosis or bruising to left arm noted.   Neurological:      General: No focal deficit present.      Mental Status: She is alert and oriented to person, place, and time.   Psychiatric:         Mood and Affect: Mood normal.         Behavior: Behavior normal.         Vital Signs  ED Triage Vitals   Temperature Pulse Respirations Blood Pressure SpO2   07/28/24 0956 07/28/24 0956 07/28/24 0956 07/28/24 0956 07/28/24 0956   (!) 97.1 °F (36.2 °C) 73 18 164/85 92 %      Temp Source Heart Rate Source Patient Position - Orthostatic VS BP Location FiO2 (%)   07/28/24 0956 07/28/24 1000 -- 07/28/24 0956 --   Oral Monitor  Right arm       Pain Score       07/28/24 1035       8           Vitals:    07/28/24 0956 07/28/24 1000 07/28/24 1030 07/28/24 1200   BP: 164/85 146/70 154/94 121/65   Pulse: 73 69 82 59         Visual Acuity      ED Medications  Medications   ibuprofen (MOTRIN) tablet 600 mg (600 mg Oral Given 7/28/24 1035)       Diagnostic Studies  Results Reviewed       None                   XR shoulder 2+ views LEFT   ED Interpretation by AGNES Nathan (07/28 1124)   No obvious fracture or deformity noted on my interpretation.       Final Result by Beth Gordon MD (07/28 1342)   Impacted fracture of the surgical neck of the humerus with possible additional fractures of the lesser tuberosity   Intact greater tuberosity and humeral head      I personally discussed this study with MORENA GARRISON on 7/28/2024 1:42 PM.            Computerized Assisted Algorithm (CAA) may have been used to analyze all applicable images.         Workstation performed: [id number]         XR humerus LEFT    ED Interpretation by AGNES Nathan (07/28 1242)   Humeral head fracture      Final Result by Beth Gordon MD (07/28 1342)   Impacted fracture of the surgical neck of the humerus with possibly additional fracture through the lesser tuberosity with intact greater tuberosity. No fracture in the shaft of the humerus      Computerized Assisted Algorithm (CAA) may have been used to analyze all applicable images.         Workstation performed: [id number]                    Procedures  Procedures         ED Course                                 SBIRT 22yo+      Flowsheet Row Most Recent Value   Initial Alcohol Screen: US AUDIT-C     1. How often do you have a drink containing alcohol? 0 Filed at: 07/28/2024 0956   2. How many drinks containing alcohol do you have on a typical day you are drinking?  0 Filed at: 07/28/2024 0956   3b. FEMALE Any Age, or MALE 65+: How often do you have 4 or more drinks on one occassion? 0 Filed at: 07/28/2024 0956   Audit-C Score 0 Filed at: 07/28/2024 0956   LAUREN: How many times in the past year have you...    Used an illegal drug or used a prescription medication for non-medical reasons? Never Filed at: 07/28/2024 0956                      Medical Decision Making  Patient is a 59-year-old female presenting for evaluation of left arm pain.  Advised patient that we will obtain an x-ray of the left humerus and shoulder to rule out x-ray or dislocation.  Will give ibuprofen for pain as patient took oxycodone prior to arrival.  Patient is agreeable.    Proximal humerus fracture noted on my interpretation of x-ray.  Patient placed in sling.  Patient already follows with orthopedics and states that she can call and schedule an appointment tomorrow.  Advised patient that she may continue to alternate Tylenol and Motrin as needed for pain.  Oxycodone also prescribed for severe/breakthrough pain and advised patient to try Tylenol and Motrin before attempting oxycodone.  Strict ED  return precautions reviewed at this time and patient verbalizes understanding of discharge instructions and follow-up care.    Amount and/or Complexity of Data Reviewed  Radiology: ordered and independent interpretation performed.     Details: Reviewed.    Risk  Prescription drug management.                 Disposition  Final diagnoses:   Left shoulder pain   Humeral head fracture, left, closed, initial encounter     Time reflects when diagnosis was documented in both MDM as applicable and the Disposition within this note       Time User Action Codes Description Comment    7/28/2024 11:26 AM Pauline Paz Add [M25.512] Left shoulder pain     7/28/2024 12:17 PM Pauline Paz Add [S42.292A] Humeral head fracture, left, closed, initial encounter           ED Disposition       ED Disposition   Discharge    Condition   Stable    Date/Time   Sun Jul 28, 2024 1126    Comment   Norma Rojas discharge to home/self care.                   Follow-up Information       Follow up With Specialties Details Why Contact Info Additional Information    Britney Mckeon DO Internal Medicine Call in 1 week  [address] [facility] [address] [facility]  [address]  [place] [address]  [phone]       [facility] Emergency Medicine  If symptoms worsen [address] [facility] [address]  [phone] [facility], [address], [place], [address]    [facility] [place] [facility] Call in 1 week  [address]  [address]  [facility] [address] [phone] [facility] [place], [address], [address], [place], [place], [address] [phone]            Discharge Medication List as of 7/28/2024 12:20 PM        START taking these medications    Details   Diclofenac Sodium (VOLTAREN) 1 % Apply 2 g topically 4 (four) times a day, Starting Sun 7/28/2024, Normal       oxyCODONE (Roxicodone) 5 immediate release tablet Take 1 tablet (5 mg total) by mouth every 6 (six) hours as needed for moderate pain Max Daily Amount: 20 mg, Starting Sun 7/28/2024, Normal           CONTINUE these medications which have NOT CHANGED    Details   ALPRAZolam (XANAX) 0.25 mg tablet alprazolam 0.25 mg tablet   TAKE 1 TABLET BY MOUTH AT BEDTIME AS NEEDED, Historical Med      !! benzonatate (TESSALON PERLES) 100 mg capsule Take 1 capsule (100 mg total) by mouth 3 (three) times a day as needed for cough, Starting Wed 2/28/2024, Normal      !! benzonatate (TESSALON) 200 MG capsule Take 1 capsule (200 mg total) by mouth 3 (three) times a day as needed for cough, Starting Tue 1/30/2024, Normal      bisacodyl (DULCOLAX) 5 mg EC tablet Take 5 mg by mouth daily as needed for constipation Otc, Historical Med      capecitabine (XELODA) 500 MG tablet Historical Med      lisinopril (ZESTRIL) 5 mg tablet Take 1 tablet (5 mg total) by mouth daily, Starting Tue 10/3/2023, Normal      metoprolol succinate (TOPROL-XL) 50 mg 24 hr tablet TAKE 1 TABLET BY MOUTH EVERY DAY, Starting Tue 10/31/2023, Normal      traZODone (DESYREL) 100 mg tablet Take 1 tablet by mouth daily Takes for sleeping. , Starting Sat 10/2/2021, Historical Med       !! - Potential duplicate medications found. Please discuss with provider.          No discharge procedures on file.    PDMP Review       None            ED Provider  Electronically Signed by             AGNES Nathan  07/28/24 6054

## 2024-07-28 NOTE — DISCHARGE INSTRUCTIONS
May continue to use Tylenol or Motrin as needed for pain.  Oxy may be used for severe/breakthrough pain. May also use topical IcyHot or Biofreeze for additional comfort.  Keep arm in sling.  Follow-up with orthopedics and return to ED for any worsening symptoms.

## 2024-07-28 NOTE — RESULT ENCOUNTER NOTE
Patient already notified of test result prior to discharge.  Arm placed in sling with Ortho follow-up.

## 2024-07-29 ENCOUNTER — TELEPHONE (OUTPATIENT)
Dept: OBGYN CLINIC | Facility: CLINIC | Age: 59
End: 2024-07-29

## 2024-07-29 NOTE — TELEPHONE ENCOUNTER
Spoke to patient and advised that she would have to wait a week for follow up for this injury.   Also gave patient Dr Sukhwinder palacio to set up an appointment to follow up with him.

## 2024-09-25 ENCOUNTER — EVALUATION (OUTPATIENT)
Dept: PHYSICAL THERAPY | Facility: CLINIC | Age: 59
End: 2024-09-25
Payer: COMMERCIAL

## 2024-09-25 DIAGNOSIS — S42.292A HUMERAL HEAD FRACTURE, LEFT, CLOSED, INITIAL ENCOUNTER: Primary | ICD-10-CM

## 2024-09-25 PROCEDURE — 97110 THERAPEUTIC EXERCISES: CPT | Performed by: PHYSICAL THERAPIST

## 2024-09-25 PROCEDURE — 97161 PT EVAL LOW COMPLEX 20 MIN: CPT | Performed by: PHYSICAL THERAPIST

## 2024-09-25 NOTE — LETTER
2024    Rob Romero MD  [address] [facility] [address]    Patient: Norma Rojas   YOB: 1965   Date of Visit: 2024     Encounter Diagnosis     ICD-10-CM    1. Humeral head fracture, left, closed, initial encounter  S42.292A           Dear Dr. Romero:    Thank you for your recent referral of Norma Rojas. Please review the attached evaluation summary from Norma's recent visit.     Please verify that you agree with the plan of care by signing the attached order.     If you have any questions or concerns, please do not hesitate to call.     I sincerely appreciate the opportunity to share in the care of one of your patients and hope to have another opportunity to work with you in the near future.       Sincerely,    Cornelia Hardin, PT      Referring Provider:      I certify that I have read the below Plan of Care and certify the need for these services furnished under this plan of treatment while under my care.                    Rob Romero MD  [address] [facility] [address]  Via Fax: [phone]          PT Evaluation     Today's date: 2024  Patient name: Norma Rojas  : 1965  MRN: [id number]  Referring provider: Rob Romero MD  Dx:   Encounter Diagnosis     ICD-10-CM    1. Humeral head fracture, left, closed, initial encounter  S42.292A           Start Time: 0745  Stop Time: 0830  Total time in clinic (min): 45 minutes    Assessment  Impairments: abnormal muscle firing, abnormal muscle tone, abnormal or restricted ROM, abnormal movement, activity intolerance, impaired physical strength, lacks appropriate home exercise program, pain with function, weight-bearing intolerance and poor posture     Assessment details: Norma Rojas is a pleasant 59 y.o. female who presents with L humerus fracture, treated conservatively.  The patient's greatest concerns are fear of not being able to keep active.      No  further referral appears necessary at this time based upon examination results.    Primary movement impairment diagnosis of L shoulder ROM resulting in pathoanatomical symptoms of Humeral head fracture, left, closed, initial encounter  (primary encounter diagnosis) and limiting her ability to carry, exercise or recreation, lift, perform household chores, perform yard work, reach overhead, sleep, walk, and wash behind the back.    Impairments include: L shoulder AROM/PROM, L shoulder strength    Etiologic factors include a fall.    Discussed risks, benefits, and alternatives to treatment, and answered all patient questions to patient satisfaction.  Understanding of Dx/Px/POC: good     Prognosis: good    Goals  Impairment Goals 4-6 weeks  - Decrease pain to <3/10  - Improve shoulder AROM to WFL  - Increase shoulder strength to 4/5 throughout  - Increase scapular strength to 4/5 throughout    Functional Goals 6-8 weeks  - Return to Prior Level of Function  - Patient will be independent with HEP  - Patient will be able to reach overhead without increased pain/compensation/difficulty  - Patient will be able to reach behind back without increased pain/compensation/difficulty   - Patient will be able to wash hair without increased pain/compensation/difficulty   - Patient will be able to lift with proper mechanics         Plan  Patient would benefit from: skilled physical therapy  Planned modality interventions: cryotherapy, TENS, thermotherapy: hydrocollator packs and unattended electrical stimulation    Planned therapy interventions: abdominal trunk stabilization, behavior modification, body mechanics training, breathing training, flexibility, functional ROM exercises, home exercise program, joint mobilization, manual therapy, massage, Miller taping, muscle pump exercises, neuromuscular re-education, patient education, postural training, strengthening, stretching, therapeutic activities, therapeutic exercise and  therapeutic training    Frequency: 1x week  Duration in weeks: 8  Treatment plan discussed with: patient  Plan details: Prognosis above is given PT services 1x/week over the next 2 months and home program adherence.        Subjective Evaluation    History of Present Illness  Date of onset: 7/27/2024  Mechanism of injury: trauma  Mechanism of injury: WORK/SCHOOL: Sales  HOBBIES/EXERCISE: walking  PLOF:  no prior history of injury to shoulder  HISTORY OF CURRENT INJURY:  Patient was walking her dog and she was pulled down and fell onto her L shoulder/arm. She went to the ER the next morning and an x-ray showed impacted fracture of the surgical neck of the humerus with possibly additional fracture through the lesser tuberosity with intact greater tuberosity. No fracture in the shaft of the humerus. She was given medication and a sling and was told to see MD in a week. She went to A instead and got in right away. She saw Dr. Romero who recommended conservative management and protection in sling for 3+ weeks. She was then cleared to remove sling and was referred to PT.   PAIN LOCATION/DESCRIPTORS: Awareness of L shoulder at rest. Can be sharp with some movements  AGGRAVATING FACTORS:  reaching overhead, reaching out to the side, doing her hair, sleeping on her stomach with arm overhead, getting out of the bath tub using L arm, lifting anything heavier than a grocery back, lifting overhead, prolonged carrying of light objects, dressing  EASES: avoiding aggs  DAY PATTERN: worse in the AM after getting dressed/moving the arm  IMAGING:  x-ray  Impacted fracture of the surgical neck of the humerus with possibly additional fracture through the lesser tuberosity with intact greater tuberosity. No fracture in the shaft of the humerus  SPECIAL QUESTIONS:    Norma denies a new onset of Tingling and Numbness. Patient has breast cancer, still at risk, getting screened every 6 months.   PATIENT GOALS: Patient wishes to have a  better ROM of her shoulder.  Patient wishes to get rid of compensations to prevent injuries elsewhere.   Patient wishes to use the elliptical.    Patient Goals  Patient goals for therapy: decreased pain, increased motion, increased strength, independence with ADLs/IADLs and return to sport/leisure activities    Pain  Current pain ratin  At best pain ratin  At worst pain ratin  Location: L shoulder  Quality: dull ache, discomfort and sharp  Progression: improved          Objective     Postural Observations  Seated posture: fair  Standing posture: fair    Additional Postural Observation Details  L scapular sitting higher than R  Tension in constantin UT/levator    Active Range of Motion   Left Shoulder   Flexion: 90 degrees with pain  Abduction: 65 degrees with pain  External rotation 0°: 35 degrees with pain  External rotation BTH: C6 with pain  Internal rotation BTB: L1 with pain    Right Shoulder   Flexion: 165 degrees   Abduction: 170 degrees   External rotation 0°: 90 degrees   External rotation BTH: T4   Internal rotation BTB: T7     Passive Range of Motion   Left Shoulder   Flexion: 115 degrees with pain  Abduction: 80 degrees with pain  External rotation 45°: 35 degrees with pain  Internal rotation 45°: 70 degrees with pain    Strength/Myotome Testing     Left Shoulder     Planes of Motion   Flexion: 3   Abduction: 3   External rotation at 0°: 3   Internal rotation at 0°: 3     Right Shoulder   Normal muscle strength    Additional Strength Details  L shoulder assessed based on available AROM              POC Expires Auth Status Start Date Expiration Date PT Visit Limit     No auth      Date        Used        Remaining           Diagnosis: L humeral head fx 2024   Precautions:    Primary Goals: L shoulder AROM/PROM, L shoulder strength   *asterisks by exercise = given for HEP   Manuals        PROM     DO FOTO                                   There Ex        Pulleys        Table slides flx/scap*  10 x 5 sec ea       UT S* X 30 sec       Levator S        Thoracic ext over chair        Wall slides        Cane ER siting* 5 sec x 10       Cane ER supine        Cane flx supine                                        Neuro Re-Ed        Scap retractions        Isometrics RTC        SL ER                                                                        Patient education Diagnosis, prognosis, activity modification        Re-evaluation              Ther Act                                         Modalities             Ice

## 2024-09-25 NOTE — PROGRESS NOTES
PT Evaluation     Today's date: 2024  Patient name: Norma Rojas  : 1965  MRN: [id number]  Referring provider: Rob Romero MD  Dx:   Encounter Diagnosis     ICD-10-CM    1. Humeral head fracture, left, closed, initial encounter  S42.292A           Start Time: 0745  Stop Time: 0830  Total time in clinic (min): 45 minutes    Assessment  Impairments: abnormal muscle firing, abnormal muscle tone, abnormal or restricted ROM, abnormal movement, activity intolerance, impaired physical strength, lacks appropriate home exercise program, pain with function, weight-bearing intolerance and poor posture     Assessment details: Norma Rojas is a pleasant 59 y.o. female who presents with L humerus fracture, treated conservatively.  The patient's greatest concerns are fear of not being able to keep active.      No further referral appears necessary at this time based upon examination results.    Primary movement impairment diagnosis of L shoulder ROM resulting in pathoanatomical symptoms of Humeral head fracture, left, closed, initial encounter  (primary encounter diagnosis) and limiting her ability to carry, exercise or recreation, lift, perform household chores, perform yard work, reach overhead, sleep, walk, and wash behind the back.    Impairments include: L shoulder AROM/PROM, L shoulder strength    Etiologic factors include a fall.    Discussed risks, benefits, and alternatives to treatment, and answered all patient questions to patient satisfaction.  Understanding of Dx/Px/POC: good     Prognosis: good    Goals  Impairment Goals 4-6 weeks  - Decrease pain to <3/10  - Improve shoulder AROM to WFL  - Increase shoulder strength to 4/5 throughout  - Increase scapular strength to 4/5 throughout    Functional Goals 6-8 weeks  - Return to Prior Level of Function  - Patient will be independent with HEP  - Patient will be able to reach overhead without increased pain/compensation/difficulty  - Patient will  be able to reach behind back without increased pain/compensation/difficulty   - Patient will be able to wash hair without increased pain/compensation/difficulty   - Patient will be able to lift with proper mechanics         Plan  Patient would benefit from: skilled physical therapy  Planned modality interventions: cryotherapy, TENS, thermotherapy: hydrocollator packs and unattended electrical stimulation    Planned therapy interventions: abdominal trunk stabilization, behavior modification, body mechanics training, breathing training, flexibility, functional ROM exercises, home exercise program, joint mobilization, manual therapy, massage, Miller taping, muscle pump exercises, neuromuscular re-education, patient education, postural training, strengthening, stretching, therapeutic activities, therapeutic exercise and therapeutic training    Frequency: 1x week  Duration in weeks: 8  Treatment plan discussed with: patient  Plan details: Prognosis above is given PT services 1x/week over the next 2 months and home program adherence.        Subjective Evaluation    History of Present Illness  Date of onset: 7/27/2024  Mechanism of injury: trauma  Mechanism of injury: WORK/SCHOOL: Sales  HOBBIES/EXERCISE: walking  PLOF:  no prior history of injury to shoulder  HISTORY OF CURRENT INJURY:  Patient was walking her dog and she was pulled down and fell onto her L shoulder/arm. She went to the ER the next morning and an x-ray showed impacted fracture of the surgical neck of the humerus with possibly additional fracture through the lesser tuberosity with intact greater tuberosity. No fracture in the shaft of the humerus. She was given medication and a sling and was told to see MD in a week. [facility] went to [facility] and got in right away. She saw Dr. Romero who recommended conservative management and protection in sling for 3+ weeks. She was then cleared to remove sling and was referred to PT.   PAIN LOCATION/DESCRIPTORS:  Awareness of L shoulder at rest. Can be sharp with some movements  AGGRAVATING FACTORS:  reaching overhead, reaching out to the side, doing her hair, sleeping on her stomach with arm overhead, getting out of the bath tub using L arm, lifting anything heavier than a grocery back, lifting overhead, prolonged carrying of light objects, dressing  EASES: avoiding aggs  DAY PATTERN: worse in the AM after getting dressed/moving the arm  IMAGING:  x-ray  Impacted fracture of the surgical neck of the humerus with possibly additional fracture through the lesser tuberosity with intact greater tuberosity. No fracture in the shaft of the humerus  SPECIAL QUESTIONS:    Norma denies a new onset of Tingling and Numbness. Patient has breast cancer, still at risk, getting screened every 6 months.   PATIENT GOALS: Patient wishes to have a better ROM of her shoulder.  Patient wishes to get rid of compensations to prevent injuries elsewhere.   Patient wishes to use the elliptical.    Patient Goals  Patient goals for therapy: decreased pain, increased motion, increased strength, independence with ADLs/IADLs and return to sport/leisure activities    Pain  Current pain ratin  At best pain ratin  At worst pain ratin  Location: L shoulder  Quality: dull ache, discomfort and sharp  Progression: improved          Objective     Postural Observations  Seated posture: fair  Standing posture: fair    Additional Postural Observation Details  L scapular sitting higher than R  Tension in constantin UT/levator    Active Range of Motion   Left Shoulder   Flexion: 90 degrees with pain  Abduction: 65 degrees with pain  External rotation 0°: 35 degrees with pain  External rotation BTH: C6 with pain  Internal rotation BTB: L1 with pain    Right Shoulder   Flexion: 165 degrees   Abduction: 170 degrees   External rotation 0°: 90 degrees   External rotation BTH: T4   Internal rotation BTB: T7     Passive Range of Motion   Left Shoulder   Flexion: 115  degrees with pain  Abduction: 80 degrees with pain  External rotation 45°: 35 degrees with pain  Internal rotation 45°: 70 degrees with pain    Strength/Myotome Testing     Left Shoulder     Planes of Motion   Flexion: 3   Abduction: 3   External rotation at 0°: 3   Internal rotation at 0°: 3     Right Shoulder   Normal muscle strength    Additional Strength Details  L shoulder assessed based on available AROM              POC Expires Auth Status Start Date Expiration Date PT Visit Limit     No auth      Date        Used        Remaining           Diagnosis: L humeral head fx July 2024   Precautions:    Primary Goals: L shoulder AROM/PROM, L shoulder strength   *asterisks by exercise = given for HEP   Manuals 9/25       PROM     DO FOTO                                   There Ex        Pulleys        Table slides flx/scap* 10 x 5 sec ea       UT S* X 30 sec       Levator S        Thoracic ext over chair        Wall slides        Cane ER siting* 5 sec x 10       Cane ER supine        Cane flx supine                                        Neuro Re-Ed        Scap retractions        Isometrics RTC        SL ER                                                                        Patient education Diagnosis, prognosis, activity modification        Re-evaluation              Ther Act                                         Modalities             Ice

## 2024-09-27 ENCOUNTER — OFFICE VISIT (OUTPATIENT)
Dept: PHYSICAL THERAPY | Facility: CLINIC | Age: 59
End: 2024-09-27
Payer: COMMERCIAL

## 2024-09-27 DIAGNOSIS — S42.292A HUMERAL HEAD FRACTURE, LEFT, CLOSED, INITIAL ENCOUNTER: Primary | ICD-10-CM

## 2024-09-27 PROCEDURE — 97110 THERAPEUTIC EXERCISES: CPT | Performed by: PHYSICAL THERAPIST

## 2024-09-27 PROCEDURE — 97140 MANUAL THERAPY 1/> REGIONS: CPT | Performed by: PHYSICAL THERAPIST

## 2024-09-27 NOTE — PROGRESS NOTES
Daily Note     Today's date: 2024  Patient name: Norma Rojas  : 1965  MRN: [id number]  Referring provider: Rob Romero MD  Dx:   Encounter Diagnosis     ICD-10-CM    1. Humeral head fracture, left, closed, initial encounter  S42.292A           Start Time: 0745  Stop Time: 08  Total time in clinic (min): 38 minutes    Subjective: Patient reports more soreness yesterday and this morning.     Objective: See treatment diary below    Assessment: Trialed pulleys for ROM which she tolerated very well. She is going to purchase pulleys for home. Education on form and 2 min timeframe for pulleys. Trialed pendulums for pain relief as needed during the day. IASTM of L UT and stretching improved tightness in this area.    Plan: Continue per plan of care.       POC Expires Auth Status Start Date Expiration Date PT Visit Limit     No auth      Date        Used        Remaining           Diagnosis: L humeral head fx 2024   Precautions: AROM first, light strengthening NO THERABANDS (per MD)   Primary Goals: L shoulder AROM/PROM, L shoulder strength   *asterisks by exercise = given for HEP   Manuals       PROM  IM, PT   DO FOTO   IASTM L UT  IM, PT                              There Ex        Pulleys*  2 min flx      Table slides flx/scap* 10 x 5 sec ea 5 sec x 10 ea flx/scap      UT S* X 30 sec 3x30 sec L      Levator S  3x30 sec L      Thoracic ext over chair        Wall slides        Cane ER siting* 5 sec x 10       Cane ER supine        Cane flx supine        Pendulums  Side to side 30 sec                              Neuro Re-Ed (NO THERABANDS)        Scap retractions  X 6 with UT and levator S      Isometrics RTC        SL ER                                                                        Patient education Diagnosis, prognosis, activity modification        Re-evaluation              Ther Act                                         Modalities             Ice

## 2024-10-03 ENCOUNTER — APPOINTMENT (OUTPATIENT)
Dept: PHYSICAL THERAPY | Facility: CLINIC | Age: 59
End: 2024-10-03
Payer: COMMERCIAL

## 2024-10-07 ENCOUNTER — APPOINTMENT (OUTPATIENT)
Dept: PHYSICAL THERAPY | Facility: CLINIC | Age: 59
End: 2024-10-07
Payer: COMMERCIAL

## 2024-10-10 ENCOUNTER — APPOINTMENT (OUTPATIENT)
Dept: PHYSICAL THERAPY | Facility: CLINIC | Age: 59
End: 2024-10-10
Payer: COMMERCIAL

## 2024-10-16 ENCOUNTER — APPOINTMENT (OUTPATIENT)
Dept: PHYSICAL THERAPY | Facility: CLINIC | Age: 59
End: 2024-10-16
Payer: COMMERCIAL

## 2024-10-24 ENCOUNTER — OFFICE VISIT (OUTPATIENT)
Dept: PHYSICAL THERAPY | Facility: CLINIC | Age: 59
End: 2024-10-24
Payer: COMMERCIAL

## 2024-10-24 DIAGNOSIS — S42.292A HUMERAL HEAD FRACTURE, LEFT, CLOSED, INITIAL ENCOUNTER: Primary | ICD-10-CM

## 2024-10-24 PROCEDURE — 97112 NEUROMUSCULAR REEDUCATION: CPT

## 2024-10-24 PROCEDURE — 97110 THERAPEUTIC EXERCISES: CPT

## 2024-10-24 PROCEDURE — 97140 MANUAL THERAPY 1/> REGIONS: CPT

## 2024-10-24 NOTE — PROGRESS NOTES
Daily Note     Today's date: 10/24/2024  Patient name: Norma Rojas  : 1965  MRN: [id number]  Referring provider: Rob Romero MD  Dx:   Encounter Diagnosis     ICD-10-CM    1. Humeral head fracture, left, closed, initial encounter  S42.292A                      Subjective: Patient reports she is doing well, she finds some of the exercises from IE too easy. She was stuck in [place] and had to cancel a few times. She is unsure how much PT she needs as she is feeling better.       Objective: See treatment diary below      Assessment: Discussion held with evaluating PT present about continuation of PT. She is having less pain and was able to tolerate all exercises charted below. Added isometrics and AROM with good form after initial cues and demonstration. Encouraged use of use if she is sore. Updated and reviewed HEP to ensure correct form, she verbalized understanding.  Patient demonstrated fatigue post treatment and would benefit from continued PT      Plan:Patient will perform HEP for 2 weeks will present for RE in 2 weeks.       POC Expires Auth Status Start Date Expiration Date PT Visit Limit     No auth      Date        Used        Remaining           Diagnosis: L humeral head fx 2024   Precautions: AROM first, light strengthening NO THERABANDS (per MD)   Primary Goals: L shoulder AROM/PROM, L shoulder strength   *asterisks by exercise = given for HEP   Manuals 9/25 9/27 10/24     PROM  IM, PT TIMOTHY, PTA  DO FOTO   IASTM L UT  IM, PT                              There Ex        Pulleys*  2 min flx 2 min flx     Table slides flx/scap* Updated wall slides* 10 x 5 sec ea 5 sec x 10 ea flx/scap Wall slides   5 sec 10x ea   Flex/scap/abd     UT S* X 30 sec 3x30 sec L HEP     Levator S  3x30 sec L      Thoracic ext over chair        Wall slides        Cane ER siting* 5 sec x 10  HEP     Cane ER supine   20x 5 sec     Cane flx supine   20 x 5 sec     Pendulums  Side to side 30 sec                               Neuro Re-Ed (NO THERABANDS)        Scap retractions*  X 6 with UT and levator S 5 sec x 10      Isometrics RTC   5 sec x 10 ea flex/abd/ext     SL ER   2x10 AROM     SL abd   2x10  AROM      SL Flex   2x10  AROM                                                      Patient education    [facility]  Access Code:   [email or web address]     Diagnosis, prognosis, activity modification        Re-evaluation              Ther Act                                         Modalities             Ice

## 2024-11-07 ENCOUNTER — EVALUATION (OUTPATIENT)
Dept: PHYSICAL THERAPY | Facility: CLINIC | Age: 59
End: 2024-11-07
Payer: COMMERCIAL

## 2024-11-07 DIAGNOSIS — S42.292A HUMERAL HEAD FRACTURE, LEFT, CLOSED, INITIAL ENCOUNTER: Primary | ICD-10-CM

## 2024-11-07 PROCEDURE — 97112 NEUROMUSCULAR REEDUCATION: CPT | Performed by: PHYSICAL THERAPIST

## 2024-11-07 PROCEDURE — 97110 THERAPEUTIC EXERCISES: CPT | Performed by: PHYSICAL THERAPIST

## 2024-11-07 NOTE — PROGRESS NOTES
PT Re-Evaluation     Today's date: 2024  Patient name: Norma Rojas  : 1965  MRN: [id number]  Referring provider: Rob Romero MD  Dx:   Encounter Diagnosis     ICD-10-CM    1. Humeral head fracture, left, closed, initial encounter  S42.292A           Start Time: 0745  Stop Time: 0825  Total time in clinic (min): 40 minutes    Assessment  Impairments: abnormal muscle firing, abnormal muscle tone, abnormal or restricted ROM, abnormal movement, activity intolerance, impaired physical strength, lacks appropriate home exercise program, pain with function, weight-bearing intolerance and poor posture     Assessment details: Norma Rojas is a pleasant 59 y.o. female who presents with L humerus fracture, treated conservatively.  She demonstrates imprvoements in AROM, PROM and strength since IE. She would benefit from updated HEP and continued PT to improve strength and function.    Primary movement impairment diagnosis of L shoulder ROM resulting in pathoanatomical symptoms of Humeral head fracture, left, closed, initial encounter  (primary encounter diagnosis) and limiting her ability to carry, exercise or recreation, lift, perform household chores, perform yard work, reach overhead, sleep, walk, and wash behind the back.    Impairments include: L shoulder AROM/PROM, L shoulder strength    Etiologic factors include a fall.    Discussed risks, benefits, and alternatives to treatment, and answered all patient questions to patient satisfaction.  Understanding of Dx/Px/POC: good     Prognosis: good    Goals  Impairment Goals 4-6 weeks  - Decrease pain to <3/10 - met  - Improve shoulder AROM to WFL - partially met  - Increase shoulder strength to 4/5 throughout- partially met  - Increase scapular strength to 4/5 throughout- partially met    Functional Goals 6-8 weeks  - Return to Prior Level of Function- partially met  - Patient will be independent with HEP- partially met  - Patient will be able to  reach overhead without increased pain/compensation/difficulty - met  - Patient will be able to reach behind back without increased pain/compensation/difficulty - partially met  - Patient will be able to wash hair without increased pain/compensation/difficulty - met  - Patient will be able to lift with proper mechanics - partially met        Plan  Patient would benefit from: skilled physical therapy    Planned therapy interventions: abdominal trunk stabilization, behavior modification, body mechanics training, breathing training, flexibility, functional ROM exercises, home exercise program, joint mobilization, manual therapy, massage, Miller taping, muscle pump exercises, neuromuscular re-education, patient education, postural training, strengthening, stretching, therapeutic activities, therapeutic exercise and therapeutic training    Frequency: 2x month  Duration in weeks: 4  Treatment plan discussed with: patient        Subjective Evaluation    History of Present Illness  Date of onset: 7/27/2024  Mechanism of injury: trauma  Mechanism of injury: WORK/SCHOOL: Sales  HOBBIES/EXERCISE: walking  PLOF:  no prior history of injury to shoulder  HISTORY OF CURRENT INJURY:  Patient was walking her dog and she was pulled down and fell onto her L shoulder/arm. She went to the ER the next morning and an x-ray showed impacted fracture of the surgical neck of the humerus with possibly additional fracture through the lesser tuberosity with intact greater tuberosity. No fracture in the shaft of the humerus. She was given medication and a sling and was told to see MD in a week. She went to [facility] instead and got in right away. She saw Dr. Romero who recommended conservative management and protection in sling for 3+ weeks. She was then cleared to remove sling and was referred to PT.     Update: Patient is comfortable with most reaching and activities but still has pain and limited ROM. She can lift gorcery bags but lifting  overhead or lifting heavy she does have some difficulty.   PAIN LOCATION/DESCRIPTORS: Aching with overuse  AGGRAVATING FACTORS: getting jacket on, lifting overhead, reaching behind her back, lifting heavy, reaching out of the car window and lifting tube at the bank  Improved: reaching overhead, washing hair, dressing, reaching out to the side, sleeping on her stomach with arm overhead, getting out of the bath tub using L arm, lifting anything heavier than a grocery bag, prolonged carrying of light objects  EASES: avoiding aggs  DAY PATTERN: activity related  IMAGING:  x-ray  Impacted fracture of the surgical neck of the humerus with possibly additional fracture through the lesser tuberosity with intact greater tuberosity. No fracture in the shaft of the humerus  SPECIAL QUESTIONS:    Norma denies a new onset of Tingling and Numbness. Patient has breast cancer, still at risk, getting screened every 6 months.   PATIENT GOALS: Patient wishes to have a better ROM of her shoulder. - Patient rates herself at 80% improved  Patient wishes to get rid of compensations to prevent injuries elsewhere. - Patient rates herself at 80% improved  Patient wishes to use the elliptical. - 100%, has not attempted but did buy one for home and feels she can participate    Patient Goals  Patient goals for therapy: decreased pain, increased motion, increased strength, independence with ADLs/IADLs and return to sport/leisure activities    Pain  Current pain ratin  At best pain ratin  At worst pain ratin  Location: L shoulder  Quality: dull ache and discomfort  Progression: improved          Objective     Postural Observations  Seated posture: fair  Standing posture: fair    Additional Postural Observation Details  L scapular sitting higher than R  Tension in constantin UT/levator    Active Range of Motion   Left Shoulder   Flexion: 120 degrees   Abduction: 85 degrees   External rotation 0°: 50 degrees   External rotation BTH: T1    Internal rotation BTB: T10 with pain    Right Shoulder   Flexion: 165 degrees   Abduction: 170 degrees   External rotation 0°: 90 degrees   External rotation BTH: T4   Internal rotation BTB: T7     Passive Range of Motion   Left Shoulder   Flexion: 135 degrees with pain  Abduction: 85 degrees with pain  External rotation 45°: 45 degrees with pain  Internal rotation 45°: 70 degrees with pain    Additional Passive Range of Motion Details  Stiffness and discomfort at end-range    Strength/Myotome Testing     Left Shoulder     Planes of Motion   Flexion: 4-   Abduction: 3+   External rotation at 0°: 3+   Internal rotation at 0°: 4     Isolated Muscles   Biceps: 4   Triceps: 4-     Right Shoulder   Normal muscle strength    Additional Strength Details  L shoulder assessed within limited available ROM              POC Expires Auth Status Start Date Expiration Date PT Visit Limit     No auth      Date        Used        Remaining           Diagnosis: L humeral head fx July 2024   Precautions: AROM first, light strengthening NO THERABANDS (per MD)   Primary Goals: L shoulder AROM/PROM, L shoulder strength   *asterisks by exercise = given for HEP   Manuals 9/25 9/27 10/24 11/7    PROM  IM, PT TIMOTHY, PTA  DO FOTO   IASTM L UT  IM, PT                              There Ex        Pulleys*  2 min flx 2 min flx 2 min abduction    Table slides flx/scap* Updated wall slides* 10 x 5 sec ea 5 sec x 10 ea flx/scap Wall slides   5 sec 10x ea   Flex/scap/abd     UT S* X 30 sec 3x30 sec L HEP     Levator S  3x30 sec L      Thoracic ext over chair        Wall slides        Cane ER siting* 5 sec x 10  HEP     Cane ER supine   20x 5 sec     Cane flx supine   20 x 5 sec     Pendulums  Side to side 30 sec                              Neuro Re-Ed (NO THERABANDS)        Scap retractions*  X 6 with UT and levator S 5 sec x 10      Isometrics RTC   5 sec x 10 ea flex/abd/ext     SL ER   2x10 AROM 2x10 0.5# cuff    SL abd   2x10  AROM  2x10 0.5#  cuff    SL Flex   2x10  AROM  2x10 0.5# cuff                                                    Patient education    [facility]  Access Code:   [email or web address]     Diagnosis, prognosis, activity modification        Re-evaluation        IM, PT      Ther Act                                         Modalities             Ice

## 2024-11-07 NOTE — LETTER
2024    Rob Romero MD  [address] [facility] [address]    Patient: Norma Rojas   YOB: 1965   Date of Visit: 2024     Encounter Diagnosis     ICD-10-CM    1. Humeral head fracture, left, closed, initial encounter  S42.292A           Dear Dr. Romero:    Thank you for your recent referral of Norma Rojas. Please review the attached evaluation summary from Norma's recent visit.     Please verify that you agree with the plan of care by signing the attached order.     If you have any questions or concerns, please do not hesitate to call.     I sincerely appreciate the opportunity to share in the care of one of your patients and hope to have another opportunity to work with you in the near future.       Sincerely,    Cornelia Hardin, PT      Referring Provider:      I certify that I have read the below Plan of Care and certify the need for these services furnished under this plan of treatment while under my care.                    Rob Romero MD  [address] [place] [id number]  Via Fax: [phone]          PT Re-Evaluation     Today's date: 2024  Patient name: Norma Rojas  : 1965  MRN: [id number]  Referring provider: Rob Romero MD  Dx:   Encounter Diagnosis     ICD-10-CM    1. Humeral head fracture, left, closed, initial encounter  S42.292A           Start Time: 0745  Stop Time: 0825  Total time in clinic (min): 40 minutes    Assessment  Impairments: abnormal muscle firing, abnormal muscle tone, abnormal or restricted ROM, abnormal movement, activity intolerance, impaired physical strength, lacks appropriate home exercise program, pain with function, weight-bearing intolerance and poor posture     Assessment details: Norma Rojas is a pleasant 59 y.o. female who presents with L humerus fracture, treated conservatively.  She demonstrates imprvoements in AROM, PROM and strength since IE. She would  benefit from updated HEP and continued PT to improve strength and function.    Primary movement impairment diagnosis of L shoulder ROM resulting in pathoanatomical symptoms of Humeral head fracture, left, closed, initial encounter  (primary encounter diagnosis) and limiting her ability to carry, exercise or recreation, lift, perform household chores, perform yard work, reach overhead, sleep, walk, and wash behind the back.    Impairments include: L shoulder AROM/PROM, L shoulder strength    Etiologic factors include a fall.    Discussed risks, benefits, and alternatives to treatment, and answered all patient questions to patient satisfaction.  Understanding of Dx/Px/POC: good     Prognosis: good    Goals  Impairment Goals 4-6 weeks  - Decrease pain to <3/10 - met  - Improve shoulder AROM to WFL - partially met  - Increase shoulder strength to 4/5 throughout- partially met  - Increase scapular strength to 4/5 throughout- partially met    Functional Goals 6-8 weeks  - Return to Prior Level of Function- partially met  - Patient will be independent with HEP- partially met  - Patient will be able to reach overhead without increased pain/compensation/difficulty - met  - Patient will be able to reach behind back without increased pain/compensation/difficulty - partially met  - Patient will be able to wash hair without increased pain/compensation/difficulty - met  - Patient will be able to lift with proper mechanics - partially met        Plan  Patient would benefit from: skilled physical therapy    Planned therapy interventions: abdominal trunk stabilization, behavior modification, body mechanics training, breathing training, flexibility, functional ROM exercises, home exercise program, joint mobilization, manual therapy, massage, Miller taping, muscle pump exercises, neuromuscular re-education, patient education, postural training, strengthening, stretching, therapeutic activities, therapeutic exercise and therapeutic  training    Frequency: 2x month  Duration in weeks: 4  Treatment plan discussed with: patient        Subjective Evaluation    History of Present Illness  Date of onset: 7/27/2024  Mechanism of injury: trauma  Mechanism of injury: WORK/SCHOOL: Sales  HOBBIES/EXERCISE: walking  PLOF:  no prior history of injury to shoulder  HISTORY OF CURRENT INJURY:  Patient was walking her dog and she was pulled down and fell onto her L shoulder/arm. She went to the ER the next morning and an x-ray showed impacted fracture of the surgical neck of the humerus with possibly additional fracture through the lesser tuberosity with intact greater tuberosity. No fracture in the shaft of the humerus. She was given medication and a sling and was told to see MD in a week. She went to [facility] instead and got in right away. She saw Dr. Romero who recommended conservative management and protection in sling for 3+ weeks. She was then cleared to remove sling and was referred to PT.     Update: Patient is comfortable with most reaching and activities but still has pain and limited ROM. She can lift gorcery bags but lifting overhead or lifting heavy she does have some difficulty.   PAIN LOCATION/DESCRIPTORS: Aching with overuse  AGGRAVATING FACTORS: getting jacket on, lifting overhead, reaching behind her back, lifting heavy, reaching out of the car window and lifting tube at the bank  Improved: reaching overhead, washing hair, dressing, reaching out to the side, sleeping on her stomach with arm overhead, getting out of the bath tub using L arm, lifting anything heavier than a grocery bag, prolonged carrying of light objects  EASES: avoiding aggs  DAY PATTERN: activity related  IMAGING:  x-ray  Impacted fracture of the surgical neck of the humerus with possibly additional fracture through the lesser tuberosity with intact greater tuberosity. No fracture in the shaft of the humerus  SPECIAL QUESTIONS:    Norma denies a new onset of Tingling and  Numbness. Patient has breast cancer, still at risk, getting screened every 6 months.   PATIENT GOALS: Patient wishes to have a better ROM of her shoulder. - Patient rates herself at 80% improved  Patient wishes to get rid of compensations to prevent injuries elsewhere. - Patient rates herself at 80% improved  Patient wishes to use the elliptical. - 100%, has not attempted but did buy one for home and feels she can participate    Patient Goals  Patient goals for therapy: decreased pain, increased motion, increased strength, independence with ADLs/IADLs and return to sport/leisure activities    Pain  Current pain ratin  At best pain ratin  At worst pain ratin  Location: L shoulder  Quality: dull ache and discomfort  Progression: improved          Objective     Postural Observations  Seated posture: fair  Standing posture: fair    Additional Postural Observation Details  L scapular sitting higher than R  Tension in constantin UT/levator    Active Range of Motion   Left Shoulder   Flexion: 120 degrees   Abduction: 85 degrees   External rotation 0°: 50 degrees   External rotation BTH: T1   Internal rotation BTB: T10 with pain    Right Shoulder   Flexion: 165 degrees   Abduction: 170 degrees   External rotation 0°: 90 degrees   External rotation BTH: T4   Internal rotation BTB: T7     Passive Range of Motion   Left Shoulder   Flexion: 135 degrees with pain  Abduction: 85 degrees with pain  External rotation 45°: 45 degrees with pain  Internal rotation 45°: 70 degrees with pain    Additional Passive Range of Motion Details  Stiffness and discomfort at end-range    Strength/Myotome Testing     Left Shoulder     Planes of Motion   Flexion: 4-   Abduction: 3+   External rotation at 0°: 3+   Internal rotation at 0°: 4     Isolated Muscles   Biceps: 4   Triceps: 4-     Right Shoulder   Normal muscle strength    Additional Strength Details  L shoulder assessed within limited available ROM              POC Expires Auth  Status Start Date Expiration Date PT Visit Limit     No auth      Date        Used        Remaining           Diagnosis: L humeral head fx July 2024   Precautions: AROM first, light strengthening NO THERABANDS (per MD)   Primary Goals: L shoulder AROM/PROM, L shoulder strength   *asterisks by exercise = given for HEP   Manuals 9/25 9/27 10/24 11/7    PROM  IM, PT TIMOTHY, PTA  DO FOTO   IASTM L UT  IM, PT                              There Ex        Pulleys*  2 min flx 2 min flx 2 min abduction    Table slides flx/scap* Updated wall slides* 10 x 5 sec ea 5 sec x 10 ea flx/scap Wall slides   5 sec 10x ea   Flex/scap/abd     UT S* X 30 sec 3x30 sec L HEP     Levator S  3x30 sec L      Thoracic ext over chair        Wall slides        Cane ER siting* 5 sec x 10  HEP     Cane ER supine   20x 5 sec     Cane flx supine   20 x 5 sec     Pendulums  Side to side 30 sec                              Neuro Re-Ed (NO THERABANDS)        Scap retractions*  X 6 with UT and levator S 5 sec x 10      Isometrics RTC   5 sec x 10 ea flex/abd/ext     SL ER   2x10 AROM 2x10 0.5# cuff    SL abd   2x10  AROM  2x10 0.5# cuff    SL Flex   2x10  AROM  2x10 0.5# cuff                                                    Patient education    [facility]  Access Code:   [email or web address]     Diagnosis, prognosis, activity modification        Re-evaluation        IM, PT      Ther Act                                         Modalities             Ice

## 2024-11-12 ENCOUNTER — RA CDI HCC (OUTPATIENT)
Dept: OTHER | Facility: HOSPITAL | Age: 59
End: 2024-11-12